# Patient Record
Sex: MALE | Race: WHITE | Employment: STUDENT | URBAN - METROPOLITAN AREA
[De-identification: names, ages, dates, MRNs, and addresses within clinical notes are randomized per-mention and may not be internally consistent; named-entity substitution may affect disease eponyms.]

---

## 2017-01-04 ENCOUNTER — OFFICE VISIT (OUTPATIENT)
Dept: URGENT CARE | Facility: RETAIL CLINIC | Age: 22
End: 2017-01-04
Payer: COMMERCIAL

## 2017-01-04 VITALS
TEMPERATURE: 96.7 F | DIASTOLIC BLOOD PRESSURE: 80 MMHG | HEART RATE: 94 BPM | OXYGEN SATURATION: 98 % | SYSTOLIC BLOOD PRESSURE: 146 MMHG

## 2017-01-04 DIAGNOSIS — R05.9 COUGH: Primary | ICD-10-CM

## 2017-01-04 DIAGNOSIS — J22 CHEST COLD: ICD-10-CM

## 2017-01-04 DIAGNOSIS — J45.30 MILD PERSISTENT ASTHMA WITHOUT COMPLICATION: ICD-10-CM

## 2017-01-04 DIAGNOSIS — R03.0 ELEVATED BLOOD PRESSURE READING WITHOUT DIAGNOSIS OF HYPERTENSION: ICD-10-CM

## 2017-01-04 PROCEDURE — 99213 OFFICE O/P EST LOW 20 MIN: CPT | Performed by: PHYSICIAN ASSISTANT

## 2017-01-04 RX ORDER — CODEINE PHOSPHATE AND GUAIFENESIN 10; 100 MG/5ML; MG/5ML
1 SOLUTION ORAL EVERY 4 HOURS PRN
Qty: 120 ML | Refills: 0 | Status: SHIPPED | OUTPATIENT
Start: 2017-01-04 | End: 2017-09-25

## 2017-01-04 NOTE — PROGRESS NOTES
SUBJECTIVE:   Pt. presenting to [facility] -  with a chief complaint of cough off and on x 2 weeks - symptoms wax and wane. Some 'normal' days. Minimal nasal discharge. No SOB or chest pain. Afebrile. Energy level is normal.  Hx of asthma yes  Here with mother and sister.  Onset of symptoms gradual  Course of illness is same.    Severity mild  Current and Associated symptoms: rhinorrhea and cough   Treatment measures tried include Inhaler (name: albuterol -see med list).  Predisposing factors include HX of asthma.  Last antibiotic Doxy for acne     Smoker no    Past Medical History   Diagnosis Date     Asthma      No past surgical history on file.  Patient Active Problem List   Diagnosis     Attention deficit disorder     Seasonal allergic rhinitis     Mild persistent asthma     Current Outpatient Prescriptions   Medication     Azelastine HCl 0.15 % SOLN     doxycycline (VIBRAMYCIN) 50 MG capsule     adapalene (DIFFERIN) 0.1 % gel     omeprazole (PRILOSEC) 20 MG capsule     albuterol (VENTOLIN HFA) 108 (90 BASE) MCG/ACT inhaler     ipratropium - albuterol 0.5 mg/2.5 mg/3 mL (DUONEB) 0.5-2.5 (3) MG/3ML nebulization     EPINEPHrine (EPIPEN) 0.3 MG/0.3ML injection     ipratropium (ATROVENT) 0.03 % nasal spray     beclomethasone (QVAR) 80 MCG/ACT Inhaler     Cetirizine HCl (ZYRTEC ALLERGY PO)     SPACER/AERO CHAMBER MOUTHPIECE MISC     No current facility-administered medications for this visit.       OBJECTIVE:  /80 mmHg  Pulse 94  Temp(Src) 96.7  F (35.9  C) (Oral)  SpO2 98%    GENERAL APPEARANCE: cooperative, alert and no distress. Appears well hydrated.  EYES: conjunctiva clear  HENT: Rt ear canal  clear and TM normal   Lt ear canal clear and TM normal   Nose some congestion. clear discharge  Mouth without ulcers or lesions. no erythema. no exudate.  NECK: supple, few small shoddy NT ant nodes. No  posterior nodes.  RESP: lungs clear to auscultation - no rales, rhonchi or wheezes. Breathing  easily. Frequent dry cough  CV: regular rates and rhythm  ABDOMEN:  soft, nontender, no HSM or masses and bowel sounds normal   SKIN: no suspicious lesions or rashes  no tenderness to palpate over  sinus areas.      ASSESSMENT:         Cough  Elevated blood pressure reading without diagnosis of hypertension  Mild persistent asthma without complication  Chest cold      PLAN:  Symptomatic measures   Prescriptions as below. Discussed indications, dosing, side affects and adverse reactions of medications with  Patient -Daniele AC to take HS. Some cough may be from PND  saline nasal spray   Cool mist vaporizer.   Stay in clean air environment.  > rest.  > fluids.  Contagiousness and hygiene discussed.  Fever and pain  control measures discussed.   If unable to swallow or any breathing difficulty to go to ED     Discussed BP - needs recheck:  Patient Instructions   Your blood pressure is elevated at today's visit.  You should follow up with your primary provider regarding possible hypertension if your recheck are greater than 140/90.  Check your blood pressure several times in the next week or so. You can do this at local pharmacies, grocery stores or with the float nurse at the [facility]. Record your readings and take them with you to your appointment.  Goal BP <140/90  Do not take decongestants - they can raise your BP.  If you have chest pain, unusual headaches, vision changes or any sign or symptoms of stroke seek prompt medical attention.              Follow up with your primary care provider if not improving, anytime if worse and if symptoms do not resolve.  [facility]  [phone]    PT is comfortable with this plan.  Electronically signed,  RADHA Knowles, PAC

## 2017-01-04 NOTE — MR AVS SNAPSHOT
"              After Visit Summary   1/4/2017    Jaiden Lozoya    MRN: [id number]           Patient Information     Date Of Birth          1995        Visit Information        Provider Department      1/4/2017 12:40 PM Elizabeth Knowles PA-C [facility]        Today's Diagnoses     Cough    -  1     Mild persistent asthma with acute exacerbation         Elevated blood pressure reading without diagnosis of hypertension           Care Instructions    Your blood pressure is elevated at today's visit.  You should follow up with your primary provider regarding possible hypertension if your recheck are greater than 140/90.  Check your blood pressure several times in the next week or so. You can do this at local pharmacies, grocery stores or with the float nurse at the [facility]. Record your readings and take them with you to your appointment.  Goal BP <140/90  Do not take decongestants - they can raise your BP.  If you have chest pain, unusual headaches, vision changes or any sign or symptoms of stroke seek prompt medical attention.            Follow-ups after your visit        Your next 10 appointments already scheduled     Jan 13, 2017  3:00 PM   [facility]urse [facility] with [facility]    [address] [place] [address] [phone]              Who to contact     You can reach your care team any time of the day by calling [phone].  Notification of test results:  If you have an abnormal lab result, we will notify you by phone as soon as possible.         Additional Information About Your Visit        MyChart Information     LIKECHARITYhart lets you send messages to your doctor, view your test results, renew your prescriptions, schedule appointments and more. To sign up, go to [email or web address] . Click on \"Log in\" on the left side of the screen, which will take you to the Welcome page. Then click on \"Sign up Now\" on the " right side of the page.     You will be asked to enter the access code listed below, as well as some personal information. Please follow the directions to create your username and password.     Your access code is: [email or web address]  Expires: 2017 12:49 PM     Your access code will  in 90 days. If you need help or a new code, please call your [facility] or [phone].        Care EveryWhere ID     This is your Care EveryWhere ID. This could be used by other organizations to access your [place] medical records  [id number]        Your Vitals Were     Pulse Temperature Pulse Oximetry             94 96.7  F (35.9  C) (Oral) 98%          Blood Pressure from Last 3 Encounters:   17 146/80   16 132/84   10/20/15 128/80    Weight from Last 3 Encounters:   16 297 lb (134.718 kg) (99.88 %*)   16 293 lb (132.904 kg) (99.86 %*)   10/20/15 280 lb 4.8 oz (127.143 kg) (99.76 %*)     * Growth percentiles are based on [facility] 2-20 Years data.              Today, you had the following     No orders found for display         Today's Medication Changes          These changes are accurate as of: 17 12:49 PM.  If you have any questions, ask your nurse or doctor.               Start taking these medicines.        Dose/Directions    guaiFENesin-codeine 100-10 MG/5ML Soln solution   Commonly known as:  ROBITUSSIN AC   Used for:  Cough        Dose:  1 tsp.   Take 5 mLs by mouth every 4 hours as needed for cough   Quantity:  120 mL   Refills:  0            Where to get your medicines      Some of these will need a paper prescription and others can be bought over the counter.  Ask your nurse if you have questions.     Bring a paper prescription for each of these medications    - guaiFENesin-codeine 100-10 MG/5ML Soln solution             Primary Care Provider Office Phone # Fax #    Raymundo Hartman [phone]       [facility] [address] [facility] [address]        Thank  you!     Thank you for choosing [facility]  for your care. Our goal is always to provide you with excellent care. Hearing back from our patients is one way we can continue to improve our services. Please take a few minutes to complete the written survey that you may receive in the mail after your visit with us. Thank you!             Your Updated Medication List - Protect others around you: Learn how to safely use, store and throw away your medicines at www.disposemymeds.org.          This list is accurate as of: 1/4/17 12:49 PM.  Always use your most recent med list.                   Brand Name Dispense Instructions for use    adapalene 0.1 % gel    DIFFERIN         albuterol 108 (90 BASE) MCG/ACT Inhaler    VENTOLIN HFA    1 Inhaler    Inhale 2 puffs into the lungs every 4 hours as needed for shortness of breath / dyspnea       Azelastine HCl 0.15 % Soln      Spray 1 spray in nostril daily       beclomethasone 80 MCG/ACT Inhaler    QVAR    1 Inhaler    Inhale 2 puffs into the lungs 2 times daily 2 puff in morning 2 puffs at night       doxycycline 50 MG capsule    VIBRAMYCIN     Take 1 capsule by mouth 2 times daily       EPINEPHrine 0.3 MG/0.3ML injection     0.6 mL    Inject 0.3 mLs (0.3 mg) into the muscle as needed for anaphylaxis       guaiFENesin-codeine 100-10 MG/5ML Soln solution    ROBITUSSIN AC    120 mL    Take 5 mLs by mouth every 4 hours as needed for cough       ipratropium - albuterol 0.5 mg/2.5 mg/3 mL 0.5-2.5 (3) MG/3ML neb solution    DUONEB    1 Box    Inhale 1 vial (3 mLs) into the lungs every 4 hours as needed for shortness of breath / dyspnea       ipratropium 0.03 % spray    ATROVENT         omeprazole 20 MG CR capsule    priLOSEC    60 capsule    Take 1 capsule (20 mg) by mouth 2 times daily       Spacer/Aero Chamber Mouthpiece Misc     1    use with albuterol inhaler       ZYRTEC ALLERGY PO

## 2017-01-04 NOTE — PATIENT INSTRUCTIONS
Your blood pressure is elevated at today's visit.  You should follow up with your primary provider regarding possible hypertension if your recheck are greater than 140/90.  Check your blood pressure several times in the next week or so. You can do this at local pharmacies, grocery stores or with the float nurse at the [facility]. Record your readings and take them with you to your appointment.  Goal BP <140/90  Do not take decongestants - they can raise your BP.  If you have chest pain, unusual headaches, vision changes or any sign or symptoms of stroke seek prompt medical attention.

## 2017-01-13 ENCOUNTER — ALLIED HEALTH/NURSE VISIT (OUTPATIENT)
Dept: ALLERGY | Facility: CLINIC | Age: 22
End: 2017-01-13
Payer: COMMERCIAL

## 2017-01-13 DIAGNOSIS — J30.9 ALLERGIC RHINITIS, UNSPECIFIED: Primary | ICD-10-CM

## 2017-01-13 PROCEDURE — 95115 IMMUNOTHERAPY ONE INJECTION: CPT

## 2017-01-13 NOTE — PROGRESS NOTES
Patient presented after waiting 30 minutes with no reaction to allergy injections. Discharged from clinic.  Kandi Obrien MA

## 2017-01-26 ENCOUNTER — TELEPHONE (OUTPATIENT)
Dept: FAMILY MEDICINE | Facility: CLINIC | Age: 22
End: 2017-01-26

## 2017-02-07 ENCOUNTER — TELEPHONE (OUTPATIENT)
Dept: ALLERGY | Facility: OTHER | Age: 22
End: 2017-02-07

## 2017-02-07 NOTE — TELEPHONE ENCOUNTER
Pt allergy serum was received in [place] on 02/07/17 for allergy injections on 02/08/17.      Jaymie Romero CMA ([facility])

## 2017-02-08 ENCOUNTER — ALLIED HEALTH/NURSE VISIT (OUTPATIENT)
Dept: ALLERGY | Facility: OTHER | Age: 22
End: 2017-02-08
Payer: COMMERCIAL

## 2017-02-08 DIAGNOSIS — J30.9 ALLERGIC RHINITIS, UNSPECIFIED: Primary | ICD-10-CM

## 2017-02-08 PROCEDURE — 95115 IMMUNOTHERAPY ONE INJECTION: CPT

## 2017-02-16 DIAGNOSIS — J45.30 MILD PERSISTENT ASTHMA WITHOUT COMPLICATION: ICD-10-CM

## 2017-02-16 RX ORDER — ALBUTEROL SULFATE 90 UG/1
2 AEROSOL, METERED RESPIRATORY (INHALATION) EVERY 4 HOURS PRN
Qty: 1 INHALER | Refills: 1 | Status: SHIPPED | OUTPATIENT
Start: 2017-02-16 | End: 2017-08-30

## 2017-02-16 NOTE — TELEPHONE ENCOUNTER
Ventolin inhaler       Last Written Prescription Date: 09/20/16  Last Fill Quantity: 1, # refills: 3    Last Office Visit with [facility], [facility] or [facility] prescribing provider:  09/20/16   Future Office Visit:       Date of Last Asthma Action Plan Letter:   Asthma Action Plan Q1 Year    Topic Date Due     Asthma Action Plan - yearly  09/20/2017      Asthma Control Test:   ACT Total Scores 9/20/2016   ACT TOTAL SCORE -   ASTHMA ER VISITS -   ASTHMA HOSPITALIZATIONS -   ACT TOTAL SCORE (Goal Greater than or Equal to 20) 24   In the past 12 months, how many times did you visit the emergency room for your asthma without being admitted to the hospital? 0   In the past 12 months, how many times were you hospitalized overnight because of your asthma? 0       Date of Last Spirometry Test:   No results found for this or any previous visit.

## 2017-03-09 ENCOUNTER — ALLIED HEALTH/NURSE VISIT (OUTPATIENT)
Dept: ALLERGY | Facility: OTHER | Age: 22
End: 2017-03-09
Payer: COMMERCIAL

## 2017-03-09 DIAGNOSIS — J30.9 ALLERGIC RHINITIS, UNSPECIFIED: Primary | ICD-10-CM

## 2017-03-09 PROCEDURE — 95115 IMMUNOTHERAPY ONE INJECTION: CPT

## 2017-03-09 NOTE — MR AVS SNAPSHOT
After Visit Summary   3/9/2017    Jaiden Lozoya    MRN: [id number]           Patient Information     Date Of Birth          1995        Visit Information        Provider Department      3/9/2017 2:15 PM ER ALLERGY SHOTS [facility]        Today's Diagnoses     Allergic rhinitis, unspecified    -  1       Follow-ups after your visit        Your next 10 appointments already scheduled     Apr 06, 2017  5:15 PM CDT   [facility]urse [facility] with [facility] SH[facility]S   [facility]    [address]  [facility] [place] [facility] [address] [phone]            Apr 20, 2017  1:00 PM CDT   Nurse Only with ER ALLERGY SHOTS   [facility]    [address]  [place] [address] [phone]            May 04, 2017  4:15 PM CDT   Nurse Only with ER ALLERGY SHOTS   [facility]    [address]  [facility] [address] [phone]              Who to contact     If you have questions or need follow up information about today's clinic visit or your schedule please contact [facility] directly at [phone].  Normal or non-critical lab and imaging results will be communicated to you by Outernethart, letter or phone within 4 business days after the clinic has received the results. If you do not hear from us within 7 days, please contact the clinic through Outernethart or phone. If you have a critical or abnormal lab result, we will notify you by phone as soon as possible.  Submit refill requests through Blinkbuggy or call your pharmacy and they will forward the refill request to us. Please allow 3 business days for your refill to be completed.          Additional Information About Your Visit        Blinkbuggy Information     Blinkbuggy lets you send messages to your doctor, view your test results, renew your prescriptions, schedule appointments and  "more. To sign up, go to [email or web address] . Click on \"Log in\" on the left side of the screen, which will take you to the Welcome page. Then click on \"Sign up Now\" on the right side of the page.     You will be asked to enter the access code listed below, as well as some personal information. Please follow the directions to create your username and password.     Your access code is: [email or web address]  Expires: 2017 12:49 PM     Your access code will  in 90 days. If you need help or a new code, please call your [place] clinic or [phone].        Care EveryWhere ID     This is your Care EveryWhere ID. This could be used by other organizations to access your [facility] medical records  [id number]         Blood Pressure from Last 3 Encounters:   17 146/80   16 132/84   10/20/15 128/80    Weight from Last 3 Encounters:   16 297 lb (134.7 kg) (>99 %)*   16 293 lb (132.9 kg) (>99 %)*   10/20/15 280 lb 4.8 oz (127.1 kg) (>99 %)*     * Growth percentiles are based on [facility] 2-20 Years data.              We Performed the Following     Allergy Shot: One injection        Primary Care Provider Office Phone # Fax #    Raymundo Hartman [phone]       [place] [address] [place] [id number]        Thank you!     Thank you for choosing [facility]  for your care. Our goal is always to provide you with excellent care. Hearing back from our patients is one way we can continue to improve our services. Please take a few minutes to complete the written survey that you may receive in the mail after your visit with us. Thank you!             Your Updated Medication List - Protect others around you: Learn how to safely use, store and throw away your medicines at www.disposemymeds.org.          This list is accurate as of: 3/9/17  4:58 PM.  Always use your most recent med list.                   Brand Name Dispense Instructions for use    adapalene 0.1 % " gel    DIFFERIN         albuterol 108 (90 BASE) MCG/ACT Inhaler    VENTOLIN HFA    1 Inhaler    Inhale 2 puffs into the lungs every 4 hours as needed for shortness of breath / dyspnea       Azelastine HCl 0.15 % Soln      Spray 1 spray in nostril daily       beclomethasone 80 MCG/ACT Inhaler    QVAR    1 Inhaler    Inhale 2 puffs into the lungs 2 times daily 2 puff in morning 2 puffs at night       doxycycline 50 MG capsule    VIBRAMYCIN     Take 1 capsule by mouth 2 times daily       EPINEPHrine 0.3 MG/0.3ML injection     0.6 mL    Inject 0.3 mLs (0.3 mg) into the muscle as needed for anaphylaxis       guaiFENesin-codeine 100-10 MG/5ML Soln solution    ROBITUSSIN AC    120 mL    Take 5 mLs by mouth every 4 hours as needed for cough       ipratropium - albuterol 0.5 mg/2.5 mg/3 mL 0.5-2.5 (3) MG/3ML neb solution    DUONEB    1 Box    Inhale 1 vial (3 mLs) into the lungs every 4 hours as needed for shortness of breath / dyspnea       ipratropium 0.03 % spray    ATROVENT         omeprazole 20 MG CR capsule    priLOSEC    60 capsule    Take 1 capsule (20 mg) by mouth 2 times daily       Spacer/Aero Chamber Mouthpiece Misc     1    use with albuterol inhaler       ZYRTEC ALLERGY PO

## 2017-03-09 NOTE — PROGRESS NOTES
Patient presented after waiting 30 minutes with no reaction to allergy injections. Discharged from clinic.  Radha Pena CMA

## 2017-04-06 ENCOUNTER — ALLIED HEALTH/NURSE VISIT (OUTPATIENT)
Dept: ALLERGY | Facility: OTHER | Age: 22
End: 2017-04-06
Payer: COMMERCIAL

## 2017-04-06 DIAGNOSIS — J30.9 ALLERGIC RHINITIS, UNSPECIFIED: Primary | ICD-10-CM

## 2017-04-06 PROCEDURE — 95115 IMMUNOTHERAPY ONE INJECTION: CPT

## 2017-04-06 NOTE — PROGRESS NOTES
Patient presented after waiting 30 minutes with no reaction to allergy injections. Discharged from clinic.  Radha Pena CMA ....... 4/6/2017 6:08 PM

## 2017-04-06 NOTE — MR AVS SNAPSHOT
"              After Visit Summary   4/6/2017    Jaiden Lozoya    MRN: [id number]           Patient Information     Date Of Birth          1995        Visit Information        Provider Department      4/6/2017 5:15 PM ER ALLERGY SHOTS [facility]        Today's Diagnoses     Allergic rhinitis, unspecified    -  1       Follow-ups after your visit        Your next 10 appointments already scheduled     Apr 20, 2017  1:00 PM CDT   Nurse Only with ER ALLERGY SHOTS   [facility]    [address]  [place] [address] [phone]            May 04, 2017  4:15 PM CDT   Nurse Only with ER ALLERGY SHOTS   [facility] ([facility]    [address]  [place] [address] [phone]              Who to contact     If you have questions or need follow up information about today's clinic visit or your schedule please contact [facility] directly at [phone].  Normal or non-critical lab and imaging results will be communicated to you by Hansofthart, letter or phone within 4 business days after the clinic has received the results. If you do not hear from us within 7 days, please contact the clinic through [employer] or phone. If you have a critical or abnormal lab result, we will notify you by phone as soon as possible.  Submit refill requests through [employer] or call your pharmacy and they will forward the refill request to us. Please allow 3 business days for your refill to be completed.          Additional Information About Your Visit        HansoftharContinuent Information     Innovative Biosensors lets you send messages to your doctor, view your test results, renew your prescriptions, schedule appointments and more. To sign up, go to [email or web address] . Click on \"Log in\" on the left side of the screen, which will take you to the Welcome page. Then click on \"Sign up Now\" on the right side of the page. "     You will be asked to enter the access code listed below, as well as some personal information. Please follow the directions to create your username and password.     Your access code is: [email or web address]  Expires: 2017  6:08 PM     Your access code will  in 90 days. If you need help or a new code, please call your [facility] clinic or [phone].        Care EveryWhere ID     This is your Care EveryWhere ID. This could be used by other organizations to access your [facility] medical records  [id number]         Blood Pressure from Last 3 Encounters:   17 146/80   16 132/84   10/20/15 128/80    Weight from Last 3 Encounters:   16 297 lb (134.7 kg) (>99 %)*   16 293 lb (132.9 kg) (>99 %)*   10/20/15 280 lb 4.8 oz (127.1 kg) (>99 %)*     * Growth percentiles are based on [facility] 2-20 Years data.              We Performed the Following     Allergy Shot: One injection        Primary Care Provider Office Phone # Fax #    Raymundo Hartman [phone]       [place] [address] [place] [address]        Thank you!     Thank you for choosing [facility]  for your care. Our goal is always to provide you with excellent care. Hearing back from our patients is one way we can continue to improve our services. Please take a few minutes to complete the written survey that you may receive in the mail after your visit with us. Thank you!             Your Updated Medication List - Protect others around you: Learn how to safely use, store and throw away your medicines at www.disposemymeds.org.          This list is accurate as of: 17  6:08 PM.  Always use your most recent med list.                   Brand Name Dispense Instructions for use    adapalene 0.1 % gel    DIFFERIN         albuterol 108 (90 BASE) MCG/ACT Inhaler    VENTOLIN HFA    1 Inhaler    Inhale 2 puffs into the lungs every 4 hours as needed for shortness of breath / dyspnea       Azelastine  HCl 0.15 % Soln      Spray 1 spray in nostril daily       beclomethasone 80 MCG/ACT Inhaler    QVAR    1 Inhaler    Inhale 2 puffs into the lungs 2 times daily 2 puff in morning 2 puffs at night       doxycycline 50 MG capsule    VIBRAMYCIN     Take 1 capsule by mouth 2 times daily       EPINEPHrine 0.3 MG/0.3ML injection     0.6 mL    Inject 0.3 mLs (0.3 mg) into the muscle as needed for anaphylaxis       guaiFENesin-codeine 100-10 MG/5ML Soln solution    ROBITUSSIN AC    120 mL    Take 5 mLs by mouth every 4 hours as needed for cough       ipratropium - albuterol 0.5 mg/2.5 mg/3 mL 0.5-2.5 (3) MG/3ML neb solution    DUONEB    1 Box    Inhale 1 vial (3 mLs) into the lungs every 4 hours as needed for shortness of breath / dyspnea       ipratropium 0.03 % spray    ATROVENT         omeprazole 20 MG CR capsule    priLOSEC    60 capsule    Take 1 capsule (20 mg) by mouth 2 times daily       Spacer/Aero Chamber Mouthpiece Misc     1    use with albuterol inhaler       ZYRTEC ALLERGY PO

## 2017-04-20 ENCOUNTER — ALLIED HEALTH/NURSE VISIT (OUTPATIENT)
Dept: ALLERGY | Facility: OTHER | Age: 22
End: 2017-04-20
Payer: COMMERCIAL

## 2017-04-20 DIAGNOSIS — J30.9 ALLERGIC RHINITIS, UNSPECIFIED: Primary | ICD-10-CM

## 2017-04-20 PROCEDURE — 95115 IMMUNOTHERAPY ONE INJECTION: CPT

## 2017-04-20 NOTE — MR AVS SNAPSHOT
"              After Visit Summary   4/20/2017    Jaiden Lozoya    MRN: [id number]           Patient Information     Date Of Birth          1995        Visit Information        Provider Department      4/20/2017 1:00 PM ER ALLERGY SHOTS [facility]        Today's Diagnoses     Allergic rhinitis, unspecified    -  1       Follow-ups after your visit        Your next 10 appointments already scheduled     May 04, 2017  4:15 PM CDT   Nurse Only with ER ALLERGY SHOTS   [facility]    [address]  [facility] [id number] [phone]              Who to contact     If you have questions or need follow up information about today's clinic visit or your schedule please contact [facility] directly at [phone].  Normal or non-critical lab and imaging results will be communicated to you by MyChart, letter or phone within 4 business days after the clinic has received the results. If you do not hear from us within 7 days, please contact the clinic through MyChart or phone. If you have a critical or abnormal lab result, we will notify you by phone as soon as possible.  Submit refill requests through Krikle or call your pharmacy and they will forward the refill request to us. Please allow 3 business days for your refill to be completed.          Additional Information About Your Visit        Faniticshart Information     Krikle lets you send messages to your doctor, view your test results, renew your prescriptions, schedule appointments and more. To sign up, go to [email or web address] . Click on \"Log in\" on the left side of the screen, which will take you to the Welcome page. Then click on \"Sign up Now\" on the right side of the page.     You will be asked to enter the access code listed below, as well as some personal information. Please follow the directions to create your username and password.     Your access code is: " [email or web address]  Expires: 2017  6:08 PM     Your access code will  in 90 days. If you need help or a new code, please call your [place] clinic or [phone].        Care EveryWhere ID     This is your Care EveryWhere ID. This could be used by other organizations to access your [place] medical records  [id number]         Blood Pressure from Last 3 Encounters:   17 146/80   16 132/84   10/20/15 128/80    Weight from Last 3 Encounters:   16 297 lb (134.7 kg) (>99 %)*   16 293 lb (132.9 kg) (>99 %)*   10/20/15 280 lb 4.8 oz (127.1 kg) (>99 %)*     * Growth percentiles are based on [facility] 2-20 Years data.              We Performed the Following     Allergy Shot: One injection        Primary Care Provider Office Phone # Fax #    Raymundo Hartman [phone]       [facility] [address] [facility] [address]        Thank you!     Thank you for choosing [facility]  for your care. Our goal is always to provide you with excellent care. Hearing back from our patients is one way we can continue to improve our services. Please take a few minutes to complete the written survey that you may receive in the mail after your visit with us. Thank you!             Your Updated Medication List - Protect others around you: Learn how to safely use, store and throw away your medicines at www.disposemymeds.org.          This list is accurate as of: 17  3:30 PM.  Always use your most recent med list.                   Brand Name Dispense Instructions for use    adapalene 0.1 % gel    DIFFERIN         albuterol 108 (90 BASE) MCG/ACT Inhaler    VENTOLIN HFA    1 Inhaler    Inhale 2 puffs into the lungs every 4 hours as needed for shortness of breath / dyspnea       Azelastine HCl 0.15 % Soln      Spray 1 spray in nostril daily       beclomethasone 80 MCG/ACT Inhaler    QVAR    1 Inhaler    Inhale 2 puffs into the lungs 2 times daily 2 puff in morning 2 puffs at  night       doxycycline 50 MG capsule    VIBRAMYCIN     Take 1 capsule by mouth 2 times daily       EPINEPHrine 0.3 MG/0.3ML injection     0.6 mL    Inject 0.3 mLs (0.3 mg) into the muscle as needed for anaphylaxis       guaiFENesin-codeine 100-10 MG/5ML Soln solution    ROBITUSSIN AC    120 mL    Take 5 mLs by mouth every 4 hours as needed for cough       ipratropium - albuterol 0.5 mg/2.5 mg/3 mL 0.5-2.5 (3) MG/3ML neb solution    DUONEB    1 Box    Inhale 1 vial (3 mLs) into the lungs every 4 hours as needed for shortness of breath / dyspnea       ipratropium 0.03 % spray    ATROVENT         omeprazole 20 MG CR capsule    priLOSEC    60 capsule    Take 1 capsule (20 mg) by mouth 2 times daily       Spacer/Aero Chamber Mouthpiece Misc     1    use with albuterol inhaler       ZYRTEC ALLERGY PO

## 2017-04-20 NOTE — PROGRESS NOTES
Patient presented after waiting 30 minutes with no reaction to allergy injections. Discharged from clinic.  Radha Pena CMA ....... 4/20/2017 3:29 PM

## 2017-05-04 ENCOUNTER — ALLIED HEALTH/NURSE VISIT (OUTPATIENT)
Dept: ALLERGY | Facility: OTHER | Age: 22
End: 2017-05-04
Payer: COMMERCIAL

## 2017-05-04 DIAGNOSIS — J30.9 ALLERGIC RHINITIS, UNSPECIFIED: Primary | ICD-10-CM

## 2017-05-04 PROCEDURE — 95115 IMMUNOTHERAPY ONE INJECTION: CPT

## 2017-05-04 NOTE — MR AVS SNAPSHOT
After Visit Summary   5/4/2017    Jaiden Lozoya    MRN: [id number]           Patient Information     Date Of Birth          1995        Visit Information        Provider Department      5/4/2017 4:15 PM ER ALLERGY SHOTS [facility]        Today's Diagnoses     Allergic rhinitis, unspecified    -  1       Follow-ups after your visit        Your next 10 appointments already scheduled     Jun 01, 2017  5:45 PM CDT   [facility]    [address]  [facility] [address]   [phone]            Jun 29, 2017  4:30 PM CDT   Nurse Only with ER [facility] SHOT[facility]   [facility]    [address]  [facility] [facility] [address]   [phone]            Jul 27, 2017  4:30 PM CDT   Nurse Only with ER [facility]ERGY SHOTS   [facility]    [address]  [facility] [facility] [address]   [phone]            Aug 24, 2017  4:30 PM CDT   Nurse Only with ER ALLERGY SHOTS   [facility]    [address]  [facility] [facility] [address]   [phone]              Who to contact     If you have questions or need follow up information about today's clinic visit or your schedule please contact [facility] directly at [phone].  Normal or non-critical lab and imaging results will be communicated to you by MyChart, letter or phone within 4 business days after the clinic has received the results. If you do not hear from us within 7 days, please contact the clinic through [employer] Cleaninghart or phone. If you have a critical or abnormal lab result, we will notify you by phone as soon as possible.  Submit refill requests through StoryWorth or call your pharmacy and they will forward the refill request to us. Please allow 3 business days for your refill to be completed.  "         Additional Information About Your Visit        MyChart Information     [employer] lets you send messages to your doctor, view your test results, renew your prescriptions, schedule appointments and more. To sign up, go to [email or web address] . Click on \"Log in\" on the left side of the screen, which will take you to the Welcome page. Then click on \"Sign up Now\" on the right side of the page.     You will be asked to enter the access code listed below, as well as some personal information. Please follow the directions to create your username and password.     Your access code is: [email or web address]  Expires: 2017  6:08 PM     Your access code will  in 90 days. If you need help or a new code, please call your [place] clinic or [phone].        Care EveryWhere ID     This is your Care EveryWhere ID. This could be used by other organizations to access your [place] medical records  [id number]         Blood Pressure from Last 3 Encounters:   17 146/80   16 132/84   10/20/15 128/80    Weight from Last 3 Encounters:   16 134.7 kg (297 lb) (>99 %)*   16 132.9 kg (293 lb) (>99 %)*   10/20/15 127.1 kg (280 lb 4.8 oz) (>99 %)*     * Growth percentiles are based on [facility] 2-20 Years data.              We Performed the Following     Allergy Shot: One injection        Primary Care Provider Office Phone # Fax #    Raymundo Hartman [phone]       [facility] [address] [facility] [address]        Thank you!     Thank you for choosing [facility]  for your care. Our goal is always to provide you with excellent care. Hearing back from our patients is one way we can continue to improve our services. Please take a few minutes to complete the written survey that you may receive in the mail after your visit with us. Thank you!             Your Updated Medication List - Protect others around you: Learn how to safely use, store and throw away your " medicines at www.disposemymeds.org.          This list is accurate as of: 5/4/17  4:38 PM.  Always use your most recent med list.                   Brand Name Dispense Instructions for use    adapalene 0.1 % gel    DIFFERIN         albuterol 108 (90 BASE) MCG/ACT Inhaler    VENTOLIN HFA    1 Inhaler    Inhale 2 puffs into the lungs every 4 hours as needed for shortness of breath / dyspnea       Azelastine HCl 0.15 % Soln      Spray 1 spray in nostril daily       beclomethasone 80 MCG/ACT Inhaler    QVAR    1 Inhaler    Inhale 2 puffs into the lungs 2 times daily 2 puff in morning 2 puffs at night       doxycycline 50 MG capsule    VIBRAMYCIN     Take 1 capsule by mouth 2 times daily       EPINEPHrine 0.3 MG/0.3ML injection     0.6 mL    Inject 0.3 mLs (0.3 mg) into the muscle as needed for anaphylaxis       guaiFENesin-codeine 100-10 MG/5ML Soln solution    ROBITUSSIN AC    120 mL    Take 5 mLs by mouth every 4 hours as needed for cough       ipratropium - albuterol 0.5 mg/2.5 mg/3 mL 0.5-2.5 (3) MG/3ML neb solution    DUONEB    1 Box    Inhale 1 vial (3 mLs) into the lungs every 4 hours as needed for shortness of breath / dyspnea       ipratropium 0.03 % spray    ATROVENT         omeprazole 20 MG CR capsule    priLOSEC    60 capsule    Take 1 capsule (20 mg) by mouth 2 times daily       Spacer/Aero Chamber Mouthpiece Misc     1    use with albuterol inhaler       ZYRTEC ALLERGY PO

## 2017-05-04 NOTE — PROGRESS NOTES
Patient presented after waiting 30 minutes with no reaction to allergy injections. Discharged from clinic.    Kristina Carnes RN

## 2017-06-01 ENCOUNTER — ALLIED HEALTH/NURSE VISIT (OUTPATIENT)
Dept: ALLERGY | Facility: OTHER | Age: 22
End: 2017-06-01
Payer: COMMERCIAL

## 2017-06-01 DIAGNOSIS — J30.9 ALLERGIC RHINITIS, UNSPECIFIED: Primary | ICD-10-CM

## 2017-06-01 PROCEDURE — 99207 ZZC NO CHARGE LOS: CPT

## 2017-06-01 PROCEDURE — 95115 IMMUNOTHERAPY ONE INJECTION: CPT

## 2017-06-01 NOTE — PROGRESS NOTES
Patient presented after waiting 30 minutes with no reaction to allergy injections. Discharged from clinic.    Kristina Carnes RN ....... 6/1/2017 5:02 PM

## 2017-06-01 NOTE — MR AVS SNAPSHOT
After Visit Summary   6/1/2017    Jaiden Lozoya    MRN: [id number]           Patient Information     Date Of Birth          1995        Visit Information        Provider Department      6/1/2017 5:45 PM ER ALLERGY SHOTS [facility]        Today's Diagnoses     Allergic rhinitis, unspecified    -  1       Follow-ups after your visit        Your next 10 appointments already scheduled     Jun 01, 2017  5:45 PM CDT   [facility] with [facility]    [address]  [facility] [address]   [phone]            Jun 29, 2017  4:30 PM CDT   Nurse Only with ER [facility]GY SHOTS   [facility]    [address]  [facility] [address]   [phone]            Jul 27, 2017  4:30 PM CDT   Nurse Only with ER [facility]GY SHOTS   [facility]    [address]  [facility] [address]   [phone]            Aug 24, 2017  4:30 PM CDT   Nurse Only with ER ALLERGY SHOTS   [facility]    [address]  [facility] [address]   [phone]              Who to contact     If you have questions or need follow up information about today's clinic visit or your schedule please contact [facility] directly at [phone].  Normal or non-critical lab and imaging results will be communicated to you by MyChart, letter or phone within 4 business days after the clinic has received the results. If you do not hear from us within 7 days, please contact the clinic through Delfmemshart or phone. If you have a critical or abnormal lab result, we will notify you by phone as soon as possible.  Submit refill requests through Niblitz or call your pharmacy and they will forward the refill request to us. Please allow 3 business days for your refill to be completed.  "         Additional Information About Your Visit        MyChart Information     IES lets you send messages to your doctor, view your test results, renew your prescriptions, schedule appointments and more. To sign up, go to [email or web address] . Click on \"Log in\" on the left side of the screen, which will take you to the Welcome page. Then click on \"Sign up Now\" on the right side of the page.     You will be asked to enter the access code listed below, as well as some personal information. Please follow the directions to create your username and password.     Your access code is: [email or web address]  Expires: 2017  6:08 PM     Your access code will  in 90 days. If you need help or a new code, please call your [place] clinic or [phone].        Care EveryWhere ID     This is your Care EveryWhere ID. This could be used by other organizations to access your [place] medical records  [id number]         Blood Pressure from Last 3 Encounters:   17 146/80   16 132/84   10/20/15 128/80    Weight from Last 3 Encounters:   16 134.7 kg (297 lb) (>99 %)*   16 132.9 kg (293 lb) (>99 %)*   10/20/15 127.1 kg (280 lb 4.8 oz) (>99 %)*     * Growth percentiles are based on [facility] 2-20 Years data.              We Performed the Following     Allergy Shot: One injection        Primary Care Provider Office Phone # Fax #    Raymundo Hartman [phone]       [address] [place]HA [place] [address]        Thank you!     Thank you for choosing [facility]  for your care. Our goal is always to provide you with excellent care. Hearing back from our patients is one way we can continue to improve our services. Please take a few minutes to complete the written survey that you may receive in the mail after your visit with us. Thank you!             Your Updated Medication List - Protect others around you: Learn how to safely use, store and throw away " your medicines at www.disposemymeds.org.          This list is accurate as of: 6/1/17  5:02 PM.  Always use your most recent med list.                   Brand Name Dispense Instructions for use    adapalene 0.1 % gel    DIFFERIN         albuterol 108 (90 BASE) MCG/ACT Inhaler    VENTOLIN HFA    1 Inhaler    Inhale 2 puffs into the lungs every 4 hours as needed for shortness of breath / dyspnea       Azelastine HCl 0.15 % Soln      Spray 1 spray in nostril daily       beclomethasone 80 MCG/ACT Inhaler    QVAR    1 Inhaler    Inhale 2 puffs into the lungs 2 times daily 2 puff in morning 2 puffs at night       doxycycline 50 MG capsule    VIBRAMYCIN     Take 1 capsule by mouth 2 times daily       EPINEPHrine 0.3 MG/0.3ML injection     0.6 mL    Inject 0.3 mLs (0.3 mg) into the muscle as needed for anaphylaxis       guaiFENesin-codeine 100-10 MG/5ML Soln solution    ROBITUSSIN AC    120 mL    Take 5 mLs by mouth every 4 hours as needed for cough       ipratropium - albuterol 0.5 mg/2.5 mg/3 mL 0.5-2.5 (3) MG/3ML neb solution    DUONEB    1 Box    Inhale 1 vial (3 mLs) into the lungs every 4 hours as needed for shortness of breath / dyspnea       ipratropium 0.03 % spray    ATROVENT         omeprazole 20 MG CR capsule    priLOSEC    60 capsule    Take 1 capsule (20 mg) by mouth 2 times daily       Spacer/Aero Chamber Mouthpiece Misc     1    use with albuterol inhaler       ZYRTEC ALLERGY PO

## 2017-06-29 ENCOUNTER — ALLIED HEALTH/NURSE VISIT (OUTPATIENT)
Dept: ALLERGY | Facility: OTHER | Age: 22
End: 2017-06-29
Payer: COMMERCIAL

## 2017-06-29 DIAGNOSIS — J30.9 ALLERGIC RHINITIS, UNSPECIFIED: Primary | ICD-10-CM

## 2017-06-29 PROCEDURE — 99207 ZZC NO CHARGE LOS: CPT

## 2017-06-29 PROCEDURE — 95115 IMMUNOTHERAPY ONE INJECTION: CPT

## 2017-06-29 NOTE — PROGRESS NOTES
Patient presented after waiting 30 minutes with no reaction to allergy injections. Discharged from clinic.    Kristina Carnes RN ....... 6/29/2017 4:43 PM

## 2017-06-29 NOTE — MR AVS SNAPSHOT
"              After Visit Summary   6/29/2017    Jaiden Lozoya    MRN: [id number]           Patient Information     Date Of Birth          1995        Visit Information        Provider Department      6/29/2017 4:30 PM ER ALLERGY SHOTS [facility]        Today's Diagnoses     Allergic rhinitis, unspecified    -  1       Follow-ups after your visit        Your next 10 appointments already scheduled     Jul 27, 2017  4:30 PM CDT   Nurse Only with ER ALLERGY SHOTS   [facility] ([facility])    [address]  [facility] [address] [phone]            Aug 24, 2017  4:30 PM CDT   Nurse Only with ER ALLERGY SHOTS   [facility] ([facility])    [address]  [facility] [address] [phone]              Who to contact     If you have questions or need follow up information about today's clinic visit or your schedule please contact [facility] directly at [phone].  Normal or non-critical lab and imaging results will be communicated to you by Purswayhart, letter or phone within 4 business days after the clinic has received the results. If you do not hear from us within 7 days, please contact the clinic through PathDrugomicst or phone. If you have a critical or abnormal lab result, we will notify you by phone as soon as possible.  Submit refill requests through [employer] or call your pharmacy and they will forward the refill request to us. Please allow 3 business days for your refill to be completed.          Additional Information About Your Visit        PurswayharEntitle Information     [employer] lets you send messages to your doctor, view your test results, renew your prescriptions, schedule appointments and more. To sign up, go to [email or web address] . Click on \"Log in\" on the left side of the screen, which will take you to the Welcome page. Then click on \"Sign up Now\" on the right side of the " page.     You will be asked to enter the access code listed below, as well as some personal information. Please follow the directions to create your username and password.     Your access code is: [email or web address]  Expires: 2017  6:08 PM     Your access code will  in 90 days. If you need help or a new code, please call your [place] clinic or [phone].        Care EveryWhere ID     This is your Care EveryWhere ID. This could be used by other organizations to access your [place] medical records  [id number]         Blood Pressure from Last 3 Encounters:   17 146/80   16 132/84   10/20/15 128/80    Weight from Last 3 Encounters:   16 134.7 kg (297 lb) (>99 %)*   16 132.9 kg (293 lb) (>99 %)*   10/20/15 127.1 kg (280 lb 4.8 oz) (>99 %)*     * Growth percentiles are based on [facility] 2-20 Years data.              We Performed the Following     Allergy Shot: One injection        Primary Care Provider Office Phone # Fax #    Raymundo Hartman [phone]       [address]   [facility] [address]        Equal Access to Services     LEANDRO COSBY AH: Hadii aad ku hadasho Soomaali, waaxda luqadaha, qaybta kaalmada adeegyada, mayda ramos . So [facility] [phone].    ATENCIÓN: Si habla español, tiene a valdovinos disposición servicios gratuitos de asistencia lingüística. [facility] [phone].    We comply with applicable federal civil rights laws and [place] laws. We do not discriminate on the basis of race, color, national origin, age, disability sex, sexual orientation or gender identity.            Thank you!     Thank you for choosing [facility]  for your care. Our goal is always to provide you with excellent care. Hearing back from our patients is one way we can continue to improve our services. Please take a few minutes to complete the written survey that you may receive in the mail after your visit with us. Thank  you!             Your Updated Medication List - Protect others around you: Learn how to safely use, store and throw away your medicines at www.disposemymeds.org.          This list is accurate as of: 6/29/17  4:43 PM.  Always use your most recent med list.                   Brand Name Dispense Instructions for use Diagnosis    adapalene 0.1 % gel    DIFFERIN          albuterol 108 (90 BASE) MCG/ACT Inhaler    VENTOLIN HFA    1 Inhaler    Inhale 2 puffs into the lungs every 4 hours as needed for shortness of breath / dyspnea    Mild persistent asthma without complication       Azelastine HCl 0.15 % Soln      Spray 1 spray in nostril daily        beclomethasone 80 MCG/ACT Inhaler    QVAR    1 Inhaler    Inhale 2 puffs into the lungs 2 times daily 2 puff in morning 2 puffs at night    Mild persistent asthma       doxycycline 50 MG capsule    VIBRAMYCIN     Take 1 capsule by mouth 2 times daily        EPINEPHrine 0.3 MG/0.3ML injection     0.6 mL    Inject 0.3 mLs (0.3 mg) into the muscle as needed for anaphylaxis    Need for desensitization to allergens       guaiFENesin-codeine 100-10 MG/5ML Soln solution    ROBITUSSIN AC    120 mL    Take 5 mLs by mouth every 4 hours as needed for cough    Cough       ipratropium - albuterol 0.5 mg/2.5 mg/3 mL 0.5-2.5 (3) MG/3ML neb solution    DUONEB    1 Box    Inhale 1 vial (3 mLs) into the lungs every 4 hours as needed for shortness of breath / dyspnea    Mild persistent asthma without complication       ipratropium 0.03 % spray    ATROVENT          omeprazole 20 MG CR capsule    priLOSEC    60 capsule    Take 1 capsule (20 mg) by mouth 2 times daily    Cough       Spacer/Aero Chamber Mouthpiece Misc     1    use with albuterol inhaler    Exercise induced bronchospasm       ZYRTEC ALLERGY PO

## 2017-07-27 ENCOUNTER — ALLIED HEALTH/NURSE VISIT (OUTPATIENT)
Dept: ALLERGY | Facility: OTHER | Age: 22
End: 2017-07-27
Payer: COMMERCIAL

## 2017-07-27 DIAGNOSIS — J30.9 ALLERGIC RHINITIS, UNSPECIFIED: Primary | ICD-10-CM

## 2017-07-27 PROCEDURE — 99207 ZZC NO CHARGE LOS: CPT

## 2017-07-27 PROCEDURE — 95115 IMMUNOTHERAPY ONE INJECTION: CPT

## 2017-07-27 NOTE — PROGRESS NOTES
Patient presented after waiting 30 minutes with no reaction to allergy injections. Discharged from clinic.    Kristina Carnes RN ....... 7/27/2017 5:04 PM

## 2017-07-27 NOTE — MR AVS SNAPSHOT
After Visit Summary   7/27/2017    Jaiden Lozoya    MRN: [id number]           Patient Information     Date Of Birth          1995        Visit Information        Provider Department      7/27/2017 4:30 PM ER ALLERGY SHOTS [facility]        Today's Diagnoses     Allergic rhinitis, unspecified    -  1       Follow-ups after your visit        Your next 10 appointments already scheduled     Aug 24, 2017  4:30 PM CDT   Nurse Only with ER ALLERGY SHOTS   [facility] ([facility])    [address]  [place] [address]   [phone]            Sep 21, 2017  3:00 PM CDT   Nurse Only with ER ALLERGY SHOTS   [facility] ([facility])    [address]  [place] [address]   [phone]            Sep 25, 2017  5:30 PM CDT   Well Child with Raymundo Hartman MD   [facility] ([facility]    [address] [address]   [phone]            Oct 19, 2017  3:15 PM CDT   Nurse Only with ER ALLERGY SHOTS   [facility] ([facility])    [address]  [place] [address]   [phone]            Nov 16, 2017  3:15 PM CST   Nurse Only with ER ALLERGY SHOTS   [facility] ([facility])    [address] [facility]  [place] [address]   [phone]            Dec 14, 2017  3:15 PM CST   Nurse Only with ER ALLERGY SHOTS   [facility] ([facility])    [address]  [place] [address]   [phone]              Who to contact     If you have questions or need follow up information about today's clinic visit or your schedule please contact [facility] directly at [phone].  Normal or non-critical lab and imaging results will be communicated to you by MyChart, letter or phone within 4 business days after  "the clinic has received the results. If you do not hear from us within 7 days, please contact the clinic through [employer] or phone. If you have a critical or abnormal lab result, we will notify you by phone as soon as possible.  Submit refill requests through [employer] or call your pharmacy and they will forward the refill request to us. Please allow 3 business days for your refill to be completed.          Additional Information About Your Visit        [employer] Information     [employer] lets you send messages to your doctor, view your test results, renew your prescriptions, schedule appointments and more. To sign up, go to [email or web address] . Click on \"Log in\" on the left side of the screen, which will take you to the Welcome page. Then click on \"Sign up Now\" on the right side of the page.     You will be asked to enter the access code listed below, as well as some personal information. Please follow the directions to create your username and password.     Your access code is: [email or web address]  Expires: 10/25/2017  5:04 PM     Your access code will  in 90 days. If you need help or a new code, please call your [place] clinic or [phone].        Care EveryWhere ID     This is your Care EveryWhere ID. This could be used by other organizations to access your [place] medical records  [id number]         Blood Pressure from Last 3 Encounters:   17 146/80   16 132/84   10/20/15 128/80    Weight from Last 3 Encounters:   16 134.7 kg (297 lb) (>99 %)*   16 132.9 kg (293 lb) (>99 %)*   10/20/15 127.1 kg (280 lb 4.8 oz) (>99 %)*     * Growth percentiles are based on CDC 2-20 Years data.              We Performed the Following     Allergy Shot: One injection        Primary Care Provider Office Phone # Fax #    Raymundo Hartman [phone]       [address]   [facility] [address]        Equal Access to Services     LEANDRO COSBY AH: Alina diaz " Kayla, melbada luqadaha, qashaunnata kaesther newton, mayda miriin hayaan samanthaaris gerrisita laherbneal aidan. So [facility] [phone].    ATENCIÓN: Si darian patel, tiene a valdovinos disposición servicios gratuitos de asistencia lingüística. Darlin al [phone].    We comply with applicable federal civil rights laws and [place] laws. We do not discriminate on the basis of race, color, national origin, age, disability sex, sexual orientation or gender identity.            Thank you!     Thank you for choosing [facility]  for your care. Our goal is always to provide you with excellent care. Hearing back from our patients is one way we can continue to improve our services. Please take a few minutes to complete the written survey that you may receive in the mail after your visit with us. Thank you!             Your Updated Medication List - Protect others around you: Learn how to safely use, store and throw away your medicines at www.disposemymeds.org.          This list is accurate as of: 7/27/17  5:04 PM.  Always use your most recent med list.                   Brand Name Dispense Instructions for use Diagnosis    adapalene 0.1 % gel    DIFFERIN          albuterol 108 (90 BASE) MCG/ACT Inhaler    VENTOLIN HFA    1 Inhaler    Inhale 2 puffs into the lungs every 4 hours as needed for shortness of breath / dyspnea    Mild persistent asthma without complication       Azelastine HCl 0.15 % Soln      Spray 1 spray in nostril daily        beclomethasone 80 MCG/ACT Inhaler    QVAR    1 Inhaler    Inhale 2 puffs into the lungs 2 times daily 2 puff in morning 2 puffs at night    Mild persistent asthma       doxycycline 50 MG capsule    VIBRAMYCIN     Take 1 capsule by mouth 2 times daily        EPINEPHrine 0.3 MG/0.3ML injection 2-pack    EPIPEN/ADRENACLICK/or ANY BX GENERIC EQUIV    0.6 mL    Inject 0.3 mLs (0.3 mg) into the muscle as needed for anaphylaxis    Need for desensitization to allergens       guaiFENesin-codeine  100-10 MG/5ML Soln solution    ROBITUSSIN AC    120 mL    Take 5 mLs by mouth every 4 hours as needed for cough    Cough       ipratropium - albuterol 0.5 mg/2.5 mg/3 mL 0.5-2.5 (3) MG/3ML neb solution    DUONEB    1 Box    Inhale 1 vial (3 mLs) into the lungs every 4 hours as needed for shortness of breath / dyspnea    Mild persistent asthma without complication       ipratropium 0.03 % spray    ATROVENT          omeprazole 20 MG CR capsule    priLOSEC    60 capsule    Take 1 capsule (20 mg) by mouth 2 times daily    Cough       Spacer/Aero Chamber Mouthpiece Misc     1    use with albuterol inhaler    Exercise induced bronchospasm       ZYRTEC ALLERGY PO

## 2017-08-24 ENCOUNTER — ALLIED HEALTH/NURSE VISIT (OUTPATIENT)
Dept: ALLERGY | Facility: OTHER | Age: 22
End: 2017-08-24
Payer: COMMERCIAL

## 2017-08-24 DIAGNOSIS — J30.9 ALLERGIC RHINITIS, UNSPECIFIED: Primary | ICD-10-CM

## 2017-08-24 PROCEDURE — 95115 IMMUNOTHERAPY ONE INJECTION: CPT

## 2017-08-24 NOTE — PROGRESS NOTES
Patient presented after waiting 30 minutes with no reaction to allergy injections. Discharged from clinic.    Kristina Carnes RN ....... 8/24/2017 5:10 PM

## 2017-08-24 NOTE — MR AVS SNAPSHOT
After Visit Summary   8/24/2017    Jaiden Lozoya    MRN: [id number]           Patient Information     Date Of Birth          1995        Visit Information        Provider Department      8/24/2017 4:30 PM ER ALLERGY SHOTS [facility]        Today's Diagnoses     Allergic rhinitis, unspecified    -  1       Follow-ups after your visit        Your next 10 appointments already scheduled     Sep 21, 2017  3:00 PM CDT   Nurse Only with ER ALLERGY SHOTS   [facility] ([facility])    [address]  [address]  [facility] [address]   [phone]            Sep 25, 2017  5:30 PM CDT   Well Child with Raymundo Hartman MD   [facility]    [address] [address]   [phone]            Oct 19, 2017  3:15 PM CDT   Nurse Only with ER ALLERGY SHOTS   [facility] ([facility])    [address]  [facility] [address]   [phone]            Nov 16, 2017  3:15 PM CST   Nurse Only with ER ALLERGY SHOTS   [facility] ([facility])    [address]  [facility] [address]   [phone]            Dec 14, 2017  3:15 PM CST   Nurse Only with ER ALLERGY SHOTS   [facility] ([facility])    [address]  [facility] [address]   [phone]              Who to contact     If you have questions or need follow up information about today's clinic visit or your schedule please contact [facility] directly at [phone].  Normal or non-critical lab and imaging results will be communicated to you by MyChart, letter or phone within 4 business days after the clinic has received the results. If you do not hear from us within 7 days, please contact the clinic through MyChart or phone. If you have a critical or abnormal lab result, we will notify you by  "phone as soon as possible.  Submit refill requests through [employer] or call your pharmacy and they will forward the refill request to us. Please allow 3 business days for your refill to be completed.          Additional Information About Your Visit        GREE Information     GREE lets you send messages to your doctor, view your test results, renew your prescriptions, schedule appointments and more. To sign up, go to [email or web address] . Click on \"Log in\" on the left side of the screen, which will take you to the Welcome page. Then click on \"Sign up Now\" on the right side of the page.     You will be asked to enter the access code listed below, as well as some personal information. Please follow the directions to create your username and password.     Your access code is: [email or web address]  Expires: 10/25/2017  5:04 PM     Your access code will  in 90 days. If you need help or a new code, please call your [place] clinic or [phone].        Care EveryWhere ID     This is your Care EveryWhere ID. This could be used by other organizations to access your [place] medical records  [id number]         Blood Pressure from Last 3 Encounters:   17 146/80   16 132/84   10/20/15 128/80    Weight from Last 3 Encounters:   16 134.7 kg (297 lb) (>99 %)*   16 132.9 kg (293 lb) (>99 %)*   10/20/15 127.1 kg (280 lb 4.8 oz) (>99 %)*     * Growth percentiles are based on [facility] 2-20 Years data.              We Performed the Following     Allergy Shot: One injection        Primary Care Provider Office Phone # Fax #    Raymundo Hartman [phone] [address] [place] [address]        Equal Access to Services     [facility] HARJEET : Alina Garza, waaxda luqadaha, qaybta kaalmada aman, mayda bermudez. [facility] [phone].    ATENCIÓN: Si habla español, tiene a valdovinos disposición servicios gratuitos de asistencia lingüística. Llame " al [phone].    We comply with applicable federal civil rights laws and [place] laws. We do not discriminate on the basis of race, color, national origin, age, disability sex, sexual orientation or gender identity.            Thank you!     Thank you for choosing [facility]  for your care. Our goal is always to provide you with excellent care. Hearing back from our patients is one way we can continue to improve our services. Please take a few minutes to complete the written survey that you may receive in the mail after your visit with us. Thank you!             Your Updated Medication List - Protect others around you: Learn how to safely use, store and throw away your medicines at www.disposemymeds.org.          This list is accurate as of: 8/24/17  5:10 PM.  Always use your most recent med list.                   Brand Name Dispense Instructions for use Diagnosis    adapalene 0.1 % gel    DIFFERIN          albuterol 108 (90 BASE) MCG/ACT Inhaler    VENTOLIN HFA    1 Inhaler    Inhale 2 puffs into the lungs every 4 hours as needed for shortness of breath / dyspnea    Mild persistent asthma without complication       Azelastine HCl 0.15 % Soln      Spray 1 spray in nostril daily        beclomethasone 80 MCG/ACT Inhaler    QVAR    1 Inhaler    Inhale 2 puffs into the lungs 2 times daily 2 puff in morning 2 puffs at night    Mild persistent asthma       doxycycline 50 MG capsule    VIBRAMYCIN     Take 1 capsule by mouth 2 times daily        EPINEPHrine 0.3 MG/0.3ML injection 2-pack    EPIPEN/ADRENACLICK/or ANY BX GENERIC EQUIV    0.6 mL    Inject 0.3 mLs (0.3 mg) into the muscle as needed for anaphylaxis    Need for desensitization to allergens       guaiFENesin-codeine 100-10 MG/5ML Soln solution    ROBITUSSIN AC    120 mL    Take 5 mLs by mouth every 4 hours as needed for cough    Cough       ipratropium - albuterol 0.5 mg/2.5 mg/3 mL 0.5-2.5 (3) MG/3ML neb solution    DUONEB    1 Box    Inhale  1 vial (3 mLs) into the lungs every 4 hours as needed for shortness of breath / dyspnea    Mild persistent asthma without complication       ipratropium 0.03 % spray    ATROVENT          omeprazole 20 MG CR capsule    priLOSEC    60 capsule    Take 1 capsule (20 mg) by mouth 2 times daily    Cough       Spacer/Aero Chamber Mouthpiece Misc     1    use with albuterol inhaler    Exercise induced bronchospasm       ZYRTEC ALLERGY PO

## 2017-08-30 DIAGNOSIS — R05.9 COUGH: ICD-10-CM

## 2017-08-30 DIAGNOSIS — T78.40XD ALLERGIC STATE, SUBSEQUENT ENCOUNTER: Primary | ICD-10-CM

## 2017-08-30 DIAGNOSIS — J45.30 MILD PERSISTENT ASTHMA WITHOUT COMPLICATION: ICD-10-CM

## 2017-08-30 RX ORDER — IPRATROPIUM BROMIDE AND ALBUTEROL SULFATE 2.5; .5 MG/3ML; MG/3ML
1 SOLUTION RESPIRATORY (INHALATION) EVERY 4 HOURS PRN
Qty: 1 BOX | Refills: 0 | Status: SHIPPED | OUTPATIENT
Start: 2017-08-30 | End: 2017-09-26

## 2017-08-30 RX ORDER — ALBUTEROL SULFATE 90 UG/1
2 AEROSOL, METERED RESPIRATORY (INHALATION) EVERY 4 HOURS PRN
Qty: 1 INHALER | Refills: 0 | Status: SHIPPED | OUTPATIENT
Start: 2017-08-30 | End: 2017-09-26

## 2017-08-30 NOTE — TELEPHONE ENCOUNTER
Albuterol, Duoneb, and Omeprazole Medication is being filled for 1 time refill only due to:  Patient has upcoming appointment with PCP.     Azelastine Routing refill request to provider for review/approval because:  Medication is reported/historical    Evelia Bird RN

## 2017-08-30 NOTE — TELEPHONE ENCOUNTER
ventolin    And ipratropoium  Last Written Prescription Date: 9/20/16  Last Fill Quantity: 1inh , # refills: 1  1 osvaldo and 4 rf    Last Office Visit with [facility], [facility] or [facility] prescribing provider:  2/16/17   Future Office Visit:    Next 5 appointments (look out 90 days)     Sep 21, 2017  3:00 PM CDT   Nurse Only with ER ALLERGY SHOTS   [facility]    [address] [place] [address]   [phone]            Sep 25, 2017  5:30 PM CDT   Well Child with Raymundo Hartman MD   [facility] ([facility])    [address] [address]   [phone]            Oct 19, 2017  3:15 PM CDT   Nurse Only with ER ALLERGY SHOTS   [facility] ([facility])    [address] [place] [address]   [phone]            Nov 16, 2017  3:15 PM CST   Nurse Only with ER ALLERGY SHOTS   [facility] ([facility])    [address] [address]   [phone]                   Date of Last Asthma Action Plan Letter:   Asthma Action Plan Q1 Year    Topic Date Due     Asthma Action Plan - yearly  09/20/2017      Asthma Control Test:   ACT Total Scores 9/20/2016   ACT TOTAL SCORE -   ASTHMA ER VISITS -   ASTHMA HOSPITALIZATIONS -   ACT TOTAL SCORE (Goal Greater than or Equal to 20) 24   In the past 12 months, how many times did you visit the emergency room for your asthma without being admitted to the hospital? 0   In the past 12 months, how many times were you hospitalized overnight because of your asthma? 0       Date of Last Spirometry Test:   No results found for this or any previous visit.

## 2017-08-31 RX ORDER — AZELASTINE HCL 205.5 UG/1
1 SPRAY NASAL DAILY
Qty: 30 ML | Refills: 10 | Status: SHIPPED | OUTPATIENT
Start: 2017-08-31 | End: 2018-09-26

## 2017-09-21 ENCOUNTER — ALLIED HEALTH/NURSE VISIT (OUTPATIENT)
Dept: ALLERGY | Facility: OTHER | Age: 22
End: 2017-09-21
Payer: COMMERCIAL

## 2017-09-21 DIAGNOSIS — J30.9 ALLERGIC RHINITIS, UNSPECIFIED: Primary | ICD-10-CM

## 2017-09-21 PROCEDURE — 95115 IMMUNOTHERAPY ONE INJECTION: CPT

## 2017-09-21 NOTE — PROGRESS NOTES
Patient presented after waiting 30 minutes with no reaction to allergy injections. Discharged from clinic.    Kristina Carnes RN ....... 9/21/2017 4:14 PM

## 2017-09-21 NOTE — MR AVS SNAPSHOT
After Visit Summary   9/21/2017    Jaiden Lozoya    MRN: [id number]           Patient Information     Date Of Birth          1995        Visit Information        Provider Department      9/21/2017 3:00 PM ER ALLERGY SHOTS [facility]        Today's Diagnoses     Allergic rhinitis, unspecified    -  1       Follow-ups after your visit        Your next 10 appointments already scheduled     Sep 25, 2017  5:30 PM CDT   Well Child with Raymundo Hartman MD   [facility]    [address] [address]   [phone]            Oct 19, 2017  3:15 PM CDT   Nurse Only with ER ALLERGY SHOTS   [facility] ([facility])    [address]  [facility] [address] [phone]            Nov 16, 2017  3:15 PM CST   Nurse Only with ER ALLERGY SHOTS   [facility] ([facility])    [address]  [facility] [address] [phone]            Dec 14, 2017  3:15 PM CST   Nurse Only with ER ALLERGY SHOTS   [facility] ([facility])    [address]  [facility] [address] [phone]              Who to contact     If you have questions or need follow up information about today's clinic visit or your schedule please contact [facility] directly at [phone].  Normal or non-critical lab and imaging results will be communicated to you by MyChart, letter or phone within 4 business days after the clinic has received the results. If you do not hear from us within 7 days, please contact the clinic through MyChart or phone. If you have a critical or abnormal lab result, we will notify you by phone as soon as possible.  Submit refill requests through [employer] or call your pharmacy and they will forward the refill request to us. Please allow 3 business days for your refill to be completed.     "      Additional Information About Your Visit        MyChart Information     [address] lets you send messages to your doctor, view your test results, renew your prescriptions, schedule appointments and more. To sign up, go to [email or web address] . Click on \"Log in\" on the left side of the screen, which will take you to the Welcome page. Then click on \"Sign up Now\" on the right side of the page.     You will be asked to enter the access code listed below, as well as some personal information. Please follow the directions to create your username and password.     Your access code is: [email or web address]  Expires: 10/25/2017  5:04 PM     Your access code will  in 90 days. If you need help or a new code, please call your [place] clinic or [phone].        Care EveryWhere ID     This is your Care EveryWhere ID. This could be used by other organizations to access your [place] medical records  [id number]         Blood Pressure from Last 3 Encounters:   17 146/80   16 132/84   10/20/15 128/80    Weight from Last 3 Encounters:   16 134.7 kg (297 lb) (>99 %)*   16 132.9 kg (293 lb) (>99 %)*   10/20/15 127.1 kg (280 lb 4.8 oz) (>99 %)*     * Growth percentiles are based on [facility] 2-20 Years data.              We Performed the Following     Allergy Shot: One injection        Primary Care Provider Office Phone # Fax #    Raymundo Hartman [phone] [address] [place] [address]        Equal Access to Services     [facility] AH: Hadii phuong Garza, walaurel benites, qaybmayda mendoza. So [facility] [phone].    ATENCIÓN: Si habla español, tiene a valdovinos disposición servicios gratuitos de asistencia lingüística. Darlin al [phone].    We comply with applicable federal civil rights laws and [place] laws. We do not discriminate on the basis of race, color, national origin, age, disability sex, sexual orientation " or gender identity.            Thank you!     Thank you for choosing [facility]  for your care. Our goal is always to provide you with excellent care. Hearing back from our patients is one way we can continue to improve our services. Please take a few minutes to complete the written survey that you may receive in the mail after your visit with us. Thank you!             Your Updated Medication List - Protect others around you: Learn how to safely use, store and throw away your medicines at www.disposemymeds.org.          This list is accurate as of: 9/21/17  4:17 PM.  Always use your most recent med list.                   Brand Name Dispense Instructions for use Diagnosis    adapalene 0.1 % gel    DIFFERIN          albuterol 108 (90 BASE) MCG/ACT Inhaler    VENTOLIN HFA    1 Inhaler    Inhale 2 puffs into the lungs every 4 hours as needed for shortness of breath / dyspnea    Mild persistent asthma without complication       Azelastine HCl 0.15 % Soln     30 mL    Spray 1 spray in nostril daily    Allergic state, subsequent encounter       beclomethasone 80 MCG/ACT Inhaler    QVAR    1 Inhaler    Inhale 2 puffs into the lungs 2 times daily 2 puff in morning 2 puffs at night    Mild persistent asthma       doxycycline 50 MG capsule    VIBRAMYCIN     Take 1 capsule by mouth 2 times daily        EPINEPHrine 0.3 MG/0.3ML injection 2-pack    EPIPEN/ADRENACLICK/or ANY BX GENERIC EQUIV    0.6 mL    Inject 0.3 mLs (0.3 mg) into the muscle as needed for anaphylaxis    Need for desensitization to allergens       guaiFENesin-codeine 100-10 MG/5ML Soln solution    ROBITUSSIN AC    120 mL    Take 5 mLs by mouth every 4 hours as needed for cough    Cough       ipratropium - albuterol 0.5 mg/2.5 mg/3 mL 0.5-2.5 (3) MG/3ML neb solution    DUONEB    1 Box    Inhale 1 vial (3 mLs) into the lungs every 4 hours as needed for shortness of breath / dyspnea    Mild persistent asthma without complication       ipratropium  0.03 % spray    ATROVENT          omeprazole 20 MG CR capsule    priLOSEC    60 capsule    TAKE 1 CAPSULE BY MOUTH TWICE A DAY    Cough       Spacer/Aero Chamber Mouthpiece Misc     1    use with albuterol inhaler    Exercise induced bronchospasm       ZYRTEC ALLERGY PO

## 2017-09-25 ENCOUNTER — OFFICE VISIT (OUTPATIENT)
Dept: FAMILY MEDICINE | Facility: CLINIC | Age: 22
End: 2017-09-25
Payer: COMMERCIAL

## 2017-09-25 VITALS
RESPIRATION RATE: 16 BRPM | HEIGHT: 69 IN | WEIGHT: 300.25 LBS | OXYGEN SATURATION: 98 % | DIASTOLIC BLOOD PRESSURE: 72 MMHG | SYSTOLIC BLOOD PRESSURE: 132 MMHG | HEART RATE: 93 BPM | TEMPERATURE: 98.5 F | BODY MASS INDEX: 44.47 KG/M2

## 2017-09-25 DIAGNOSIS — Z00.00 ROUTINE GENERAL MEDICAL EXAMINATION AT A HEALTH CARE FACILITY: Primary | ICD-10-CM

## 2017-09-25 DIAGNOSIS — Z23 NEED FOR PROPHYLACTIC VACCINATION AND INOCULATION AGAINST INFLUENZA: ICD-10-CM

## 2017-09-25 DIAGNOSIS — J45.30 MILD PERSISTENT ASTHMA WITHOUT COMPLICATION: ICD-10-CM

## 2017-09-25 PROCEDURE — 90471 IMMUNIZATION ADMIN: CPT | Performed by: FAMILY MEDICINE

## 2017-09-25 PROCEDURE — 90686 IIV4 VACC NO PRSV 0.5 ML IM: CPT | Performed by: FAMILY MEDICINE

## 2017-09-25 PROCEDURE — 99395 PREV VISIT EST AGE 18-39: CPT | Mod: 25 | Performed by: FAMILY MEDICINE

## 2017-09-25 RX ORDER — FLUTICASONE FUROATE 200 UG/1
200 POWDER RESPIRATORY (INHALATION)
Refills: 5 | COMMUNITY
Start: 2017-08-09 | End: 2018-09-26

## 2017-09-25 ASSESSMENT — PAIN SCALES - GENERAL: PAINLEVEL: NO PAIN (0)

## 2017-09-25 NOTE — PROGRESS NOTES
Injectable Influenza Immunization Documentation    1.  Is the person to be vaccinated sick today?   No    2. Does the person to be vaccinated have an allergy to a component   of the vaccine?   No    3. Has the person to be vaccinated ever had a serious reaction   to influenza vaccine in the past?   No    4. Has the person to be vaccinated ever had Guillain-Barré syndrome?   No    Form completed by   gerri

## 2017-09-25 NOTE — PROGRESS NOTES
SUBJECTIVE:   CC: Jaiden Lozoya is an 22 year old male who presents for preventative health visit.     Physical   Annual:     Getting at least 3 servings of Calcium per day::  Yes    Bi-annual eye exam::  Yes    Dental care twice a year::  Yes    Sleep apnea or symptoms of sleep apnea::  None    Diet::  Regular (no restrictions)    Frequency of exercise::  6-7 days/week    Duration of exercise::  Greater than 60 minutes    Taking medications regularly::  Yes    Medication side effects::  None    Additional concerns today::  No                Today's PHQ-2 Score: PHQ-2 ( 1999 Pfizer) 9/25/2017   Q1: Little interest or pleasure in doing things 0   Q2: Feeling down, depressed or hopeless 0   PHQ-2 Score 0   Q1: Little interest or pleasure in doing things Not at all   Q2: Feeling down, depressed or hopeless Not at all   PHQ-2 Score 0       Abuse: Current or Past(Physical, Sexual or Emotional)- No  Do you feel safe in your environment - Yes    Social History   Substance Use Topics     Smoking status: Never Smoker     Smokeless tobacco: Never Used     Alcohol use No     The patient does not drink >3 drinks per day nor >7 drinks per week.    Last PSA: No results found for: PSA    Reviewed orders with patient. Reviewed health maintenance and updated orders accordingly - Yes  BP Readings from Last 3 Encounters:   09/25/17 132/72   01/04/17 146/80   09/20/16 132/84    Wt Readings from Last 3 Encounters:   09/25/17 (!) 300 lb 4 oz (136.2 kg)   09/20/16 297 lb (134.7 kg) (>99 %)*   04/05/16 293 lb (132.9 kg) (>99 %)*     * Growth percentiles are based on CDC 2-20 Years data.                  Current Outpatient Prescriptions   Medication Sig Dispense Refill     Azelastine HCl 0.15 % SOLN Spray 1 spray in nostril daily 30 mL 10     omeprazole (PRILOSEC) 20 MG CR capsule TAKE 1 CAPSULE BY MOUTH TWICE A DAY 60 capsule 0     doxycycline (VIBRAMYCIN) 50 MG capsule Take 1 capsule by mouth 2 times daily  4     adapalene (DIFFERIN) 0.1  "% gel   4     ipratropium (ATROVENT) 0.03 % nasal spray        Cetirizine HCl (ZYRTEC ALLERGY PO)        ARNUITY ELLIPTA 200 MCG/ACT inhalation powder 200 mcg  5     albuterol (VENTOLIN HFA) 108 (90 BASE) MCG/ACT Inhaler Inhale 2 puffs into the lungs every 4 hours as needed for shortness of breath / dyspnea (Patient not taking: Reported on 9/25/2017) 1 Inhaler 0     ipratropium - albuterol 0.5 mg/2.5 mg/3 mL (DUONEB) 0.5-2.5 (3) MG/3ML neb solution Inhale 1 vial (3 mLs) into the lungs every 4 hours as needed for shortness of breath / dyspnea (Patient not taking: Reported on 9/25/2017) 1 Box 0     EPINEPHrine (EPIPEN) 0.3 MG/0.3ML injection Inject 0.3 mLs (0.3 mg) into the muscle as needed for anaphylaxis (Patient not taking: Reported on 9/25/2017) 0.6 mL 3     SPACER/AERO CHAMBER MOUTHPIECE MISC use with albuterol inhaler (Patient not taking: No sig reported) 1 0         Reviewed and updated as needed this visit by clinical staff         Reviewed and updated as needed this visit by Provider              ROS:  C: NEGATIVE for fever, chills, change in weight  I: NEGATIVE for worrisome rashes, moles or lesions  E: NEGATIVE for vision changes or irritation  ENT: postnasal drainage and rhinorrhea-clear  R: NEGATIVE for significant cough or SOB  RESP:Hx asthma  CV: NEGATIVE for chest pain, palpitations or peripheral edema  GI: NEGATIVE for nausea, abdominal pain, heartburn, or change in bowel habits   male: negative for dysuria, hematuria, decreased urinary stream, erectile dysfunction, urethral discharge  M: NEGATIVE for significant arthralgias or myalgia  N: NEGATIVE for weakness, dizziness or paresthesias  P: NEGATIVE for changes in mood or affect, learning disabilities     OBJECTIVE:   /72 (BP Location: Right arm, Patient Position: Chair, Cuff Size: Adult Large)  Pulse 93  Temp 98.5  F (36.9  C) (Tympanic)  Resp 16  Ht 5' 9.4\" (1.763 m)  Wt (!) 300 lb 4 oz (136.2 kg)  SpO2 98%  BMI 43.83 " "kg/m2    EXAM:  GENERAL: healthy, alert and no distress  NECK: no adenopathy, no asymmetry, masses, or scars and thyroid normal to palpation  RESP: lungs clear to auscultation - no rales, rhonchi or wheezes  CV: regular rate and rhythm, normal S1 S2, no S3 or S4, no murmur, click or rub, no peripheral edema and peripheral pulses strong  ABDOMEN: soft, nontender, no hepatosplenomegaly, no masses and bowel sounds normal  MS: no gross musculoskeletal defects noted, no edema  NEURO: Normal strength and tone, mentation intact and speech normal  PSYCH: normal   LYMPH: no cervical, supraclavicular, axillary, or inguinal adenopathy    ASSESSMENT/PLAN:   1. Mild persistent asthma    - Asthma Action Plan (AAP)    2. Need for prophylactic vaccination and inoculation against influenza    - FLU VAC, SPLIT VIRUS IM > 3 YO (QUADRIVALENT) [48626]  - Vaccine Administration, Initial [08264]  - Vaccine Administration, Initial [68014]    COUNSELING:   Reviewed preventive health counseling, as reflected in patient instructions       Regular exercise       Healthy diet/nutrition         reports that he has never smoked. He has never used smokeless tobacco.    Estimated body mass index is 43.83 kg/(m^2) as calculated from the following:    Height as of this encounter: 5' 9.4\" (1.763 m).    Weight as of this encounter: 300 lb 4 oz (136.2 kg).   Weight management plan: Discussed healthy diet and exercise guidelines and patient will follow up in 12 months in clinic to re-evaluate.    Counseling Resources:  ATP IV Guidelines  Pooled Cohorts Equation Calculator  FRAX Risk Assessment  ICSI Preventive Guidelines  Dietary Guidelines for Americans, 2010  USDA's MyPlate  ASA Prophylaxis  Lung CA Screening    Raymundo Hartman MD, MD  [facility]  Answers for HPI/ROS submitted by the patient on 9/25/2017   PHQ-2 Score: 0    Injectable Influenza Immunization Documentation    1.  Is the person to be vaccinated sick today?   No    2. Does " the person to be vaccinated have an allergy to a component   of the vaccine?   No    3. Has the person to be vaccinated ever had a serious reaction   to influenza vaccine in the past?   No    4. Has the person to be vaccinated ever had Guillain-Barré syndrome?   No    Form completed by Ivana Mckeon

## 2017-09-25 NOTE — MR AVS SNAPSHOT
After Visit Summary   9/25/2017    Jaiden Lozoya    MRN: [id number]           Patient Information     Date Of Birth          1995        Visit Information        Provider Department      9/25/2017 5:30 PM Raymundo Hartman MD [facility]        Today's Diagnoses     Routine general medical examination at a health care facility    -  1    Need for prophylactic vaccination and inoculation against influenza        Mild persistent asthma without complication           Follow-ups after your visit        Your next 10 appointments already scheduled     Oct 19, 2017  3:15 PM CDT   [facility]    [address] [place] [id number]   [phone]            Nov 16, 2017  3:15 PM CST   [facility] with [facility]    [address] [id number] [phone]            Dec 14, 2017  3:15 PM CST   Nurse Only with ER ALLERGY SHOTS   [facility]    [address] [place] [id number] [phone]              Who to contact     If you have questions or need follow up information about today's clinic visit or your schedule please contact [facility] directly at [phone].  Normal or non-critical lab and imaging results will be communicated to you by MyChart, letter or phone within 4 business days after the clinic has received the results. If you do not hear from us within 7 days, please contact the clinic through MyChart or phone. If you have a critical or abnormal lab result, we will notify you by phone as soon as possible.  Submit refill requests through [facility] or call your pharmacy and they will forward the refill request to us. Please allow 3 business days for your refill to be completed.          Additional Information About  "Your Visit        [facility] Information     [employer] lets you send messages to your doctor, view your test results, renew your prescriptions, schedule appointments and more. To sign up, go to [email or web address] . Click on \"Log in\" on the left side of the screen, which will take you to the Welcome page. Then click on \"Sign up Now\" on the right side of the page.     You will be asked to enter the access code listed below, as well as some personal information. Please follow the directions to create your username and password.     Your access code is: [email or web address]  Expires: 10/25/2017  5:04 PM     Your access code will  in 90 days. If you need help or a new code, please call your [facility] clinic or [phone].        Care EveryWhere ID     This is your Care EveryWhere ID. This could be used by other organizations to access your [facility] medical records  [id number]        Your Vitals Were     Pulse Temperature Respirations Height Pulse Oximetry BMI (Body Mass Index)    93 98.5  F (36.9  C) (Tympanic) 16 5' 9.4\" (1.763 m) 98% 43.83 kg/m2       Blood Pressure from Last 3 Encounters:   17 132/72   17 146/80   16 132/84    Weight from Last 3 Encounters:   17 (!) 300 lb 4 oz (136.2 kg)   16 297 lb (134.7 kg) (>99 %)*   16 293 lb (132.9 kg) (>99 %)*     * Growth percentiles are based on CDC 2-20 Years data.              We Performed the Following     Asthma Action Plan (AAP)     FLU VAC, SPLIT VIRUS IM > 3 YO (QUADRIVALENT) [20909]     Vaccine Administration, Initial [09279]     Vaccine Administration, Initial [00911]        Primary Care Provider Office Phone # Fax #    Raymundo Hartman [phone] [address] [place] [phone]        Equal Access to Services     [facility]: Alina Garza, catrachita benites, qaybmayda mendoza. [facility] [phone].    ATENCIÓN: Si darian patel, " tiene a valdovinos disposición servicios gratuitos de asistencia lingüística. Darlin webb [phone].    We comply with applicable federal civil rights laws and [place] laws. We do not discriminate on the basis of race, color, national origin, age, disability sex, sexual orientation or gender identity.            Thank you!     Thank you for choosing [facility]  for your care. Our goal is always to provide you with excellent care. Hearing back from our patients is one way we can continue to improve our services. Please take a few minutes to complete the written survey that you may receive in the mail after your visit with us. Thank you!             Your Updated Medication List - Protect others around you: Learn how to safely use, store and throw away your medicines at www.disposemymeds.org.          This list is accurate as of: 9/25/17  7:13 PM.  Always use your most recent med list.                   Brand Name Dispense Instructions for use Diagnosis    adapalene 0.1 % gel    DIFFERIN          albuterol 108 (90 BASE) MCG/ACT Inhaler    VENTOLIN HFA    1 Inhaler    Inhale 2 puffs into the lungs every 4 hours as needed for shortness of breath / dyspnea    Mild persistent asthma without complication       ARNUITY ELLIPTA 200 MCG/ACT inhalation powder   Generic drug:  fluticasone furoate      200 mcg    Mild persistent asthma without complication       Azelastine HCl 0.15 % Soln     30 mL    Spray 1 spray in nostril daily    Allergic state, subsequent encounter       doxycycline 50 MG capsule    VIBRAMYCIN     Take 1 capsule by mouth 2 times daily        EPINEPHrine 0.3 MG/0.3ML injection 2-pack    EPIPEN/ADRENACLICK/or ANY BX GENERIC EQUIV    0.6 mL    Inject 0.3 mLs (0.3 mg) into the muscle as needed for anaphylaxis    Need for desensitization to allergens       ipratropium - albuterol 0.5 mg/2.5 mg/3 mL 0.5-2.5 (3) MG/3ML neb solution    DUONEB    1 Box    Inhale 1 vial (3 mLs) into the lungs every 4 hours  as needed for shortness of breath / dyspnea    Mild persistent asthma without complication       ipratropium 0.03 % spray    ATROVENT          omeprazole 20 MG CR capsule    priLOSEC    60 capsule    TAKE 1 CAPSULE BY MOUTH TWICE A DAY    Cough       Spacer/Aero Chamber Mouthpiece Misc     1    use with albuterol inhaler    Exercise induced bronchospasm       ZYRTEC ALLERGY PO

## 2017-09-25 NOTE — NURSING NOTE
"Chief Complaint   Patient presents with     Physical       Initial /72 (BP Location: Right arm, Patient Position: Chair, Cuff Size: Adult Large)  Pulse 93  Temp 98.5  F (36.9  C) (Tympanic)  Resp 16  Ht 5' 9.4\" (1.763 m)  Wt (!) 300 lb 4 oz (136.2 kg)  SpO2 98%  BMI 43.83 kg/m2 Estimated body mass index is 43.83 kg/(m^2) as calculated from the following:    Height as of this encounter: 5' 9.4\" (1.763 m).    Weight as of this encounter: 300 lb 4 oz (136.2 kg).  Medication Reconciliation: complete   Health Maintenance Due   Topic Date Due     ASTHMA CONTROL TEST Q6 MOS  03/20/2017     INFLUENZA VACCINE (SYSTEM ASSIGNED)  09/01/2017     ASTHMA ACTION PLAN Q1 YR  09/20/2017     Bianca Camargo, [facility]      "

## 2017-09-25 NOTE — LETTER
My Asthma Action Plan  Name: Jaiden Lozoya   YOB: 1995  Date: 9/25/2017   My doctor: Raymundo Hartman MD, MD   My clinic: [facility]        My Control Medicine: Beclomethasone (QVar) -  80 mcg daily  My Rescue Medicine: Albuterol (Proair/Ventolin/Proventil) inhaler prn   My Asthma Severity: mild persistent  Avoid your asthma triggers:                GREEN ZONE   Good Control    I feel good    No cough or wheeze    Can work, sleep and play without asthma symptoms       Take your asthma control medicine every day.     1. If exercise triggers your asthma, take your rescue medication    15 minutes before exercise or sports, and    During exercise if you have asthma symptoms  2. Spacer to use with inhaler: If you have a spacer, make sure to use it with your inhaler             YELLOW ZONE Getting Worse  I have ANY of these:    I do not feel good    Cough or wheeze    Chest feels tight    Wake up at night   1. Keep taking your Green Zone medications  2. Start taking your rescue medicine:    every 20 minutes for up to 1 hour. Then every 4 hours for 24-48 hours.  3. If you stay in the Yellow Zone for more than 12-24 hours, contact your doctor.  4. If you do not return to the Green Zone in 12-24 hours or you get worse, start taking your oral steroid medicine if prescribed by your provider.           RED ZONE Medical Alert - Get Help  I have ANY of these:    I feel awful    Medicine is not helping    Breathing getting harder    Trouble walking or talking    Nose opens wide to breathe       1. Take your rescue medicine NOW  2. If your provider has prescribed an oral steroid medicine, start taking it NOW  3. Call your doctor NOW  4. If you are still in the Red Zone after 20 minutes and you have not reached your doctor:    Take your rescue medicine again and    Call 911 or go to the emergency room right away    See your regular doctor within 2 weeks of an Emergency Room or Urgent Care visit for  follow-up treatment.        Electronically signed by: Sharon Camargo, September 25, 2017    Annual Reminders:  Meet with Asthma Educator,  Flu Shot in the Fall, consider Pneumonia Vaccination for patients with asthma (aged 19 and older).    Pharmacy:    [facility] - [place], [place] - [address] [place] [address] [address]                    Asthma Triggers  How To Control Things That Make Your Asthma Worse    Triggers are things that make your asthma worse.  Look at the list below to help you find your triggers and what you can do about them.  You can help prevent asthma flare-ups by staying away from your triggers.      Trigger                                                          What you can do   Cigarette Smoke  Tobacco smoke can make asthma worse. Do not allow smoking in your home, car or around you.  Be sure no one smokes at a child s day care or school.  If you smoke, ask your health care provider for ways to help you quit.  Ask family members to quit too.  Ask your health care provider for a referral to Quit Plan to help you quit smoking, or call 7-594-321-PLAN.     Colds, Flu, Bronchitis  These are common triggers of asthma. Wash your hands often.  Don t touch your eyes, nose or mouth.  Get a flu shot every year.     Dust Mites  These are tiny bugs that live in cloth or carpet. They are too small to see. Wash sheets and blankets in hot water every week.   Encase pillows and mattress in dust mite proof covers.  Avoid having carpet if you can. If you have carpet, vacuum weekly.   Use a dust mask and HEPA vacuum.   Pollen and Outdoor Mold  Some people are allergic to trees, grass, or weed pollen, or molds. Try to keep your windows closed.  Limit time out doors when pollen count is high.   Ask you health care provider about taking medicine during allergy season.     Animal Dander  Some people are allergic to skin flakes, urine or saliva from pets with fur or feathers. Keep pets with fur or  feathers out of your home.    If you can t keep the pet outdoors, then keep the pet out of your bedroom.  Keep the bedroom door closed.  Keep pets off cloth furniture and away from stuffed toys.     Mice, Rats, and Cockroaches  Some people are allergic to the waste from these pests.   Cover food and garbage.  Clean up spills and food crumbs.  Store grease in the refrigerator.   Keep food out of the bedroom.   Indoor Mold  This can be a trigger if your home has high moisture. Fix leaking faucets, pipes, or other sources of water.   Clean moldy surfaces.  Dehumidify basement if it is damp and smelly.   Smoke, Strong Odors, and Sprays  These can reduce air quality. Stay away from strong odors and sprays, such as perfume, powder, hair spray, paints, smoke incense, paint, cleaning products, candles and new carpet.   Exercise or Sports  Some people with asthma have this trigger. Be active!  Ask your doctor about taking medicine before sports or exercise to prevent symptoms.    Warm up for 5-10 minutes before and after sports or exercise.     Other Triggers of Asthma  Cold air:  Cover your nose and mouth with a scarf.  Sometimes laughing or crying can be a trigger.  Some medicines and food can trigger asthma.

## 2017-09-26 DIAGNOSIS — J45.30 MILD PERSISTENT ASTHMA WITHOUT COMPLICATION: ICD-10-CM

## 2017-09-26 DIAGNOSIS — R05.9 COUGH: ICD-10-CM

## 2017-09-26 RX ORDER — IPRATROPIUM BROMIDE AND ALBUTEROL SULFATE 2.5; .5 MG/3ML; MG/3ML
1 SOLUTION RESPIRATORY (INHALATION) EVERY 4 HOURS PRN
Qty: 1 BOX | Refills: 0 | Status: SHIPPED | OUTPATIENT
Start: 2017-09-26 | End: 2018-09-26

## 2017-09-26 RX ORDER — ALBUTEROL SULFATE 90 UG/1
2 AEROSOL, METERED RESPIRATORY (INHALATION) EVERY 4 HOURS PRN
Qty: 1 INHALER | Refills: 0 | Status: SHIPPED | OUTPATIENT
Start: 2017-09-26 | End: 2018-09-26

## 2017-09-26 ASSESSMENT — ASTHMA QUESTIONNAIRES: ACT_TOTALSCORE: 21

## 2017-09-26 NOTE — TELEPHONE ENCOUNTER
Reason for Call:  Medication or medication refill:     Do you use a [facility]?  Name of the pharmacy and phone number for the current request:  [facility] - [phone]    Name of the medication requested: Inhaler- Omeprazole-Vials for nebulizer    Other request: pts mom stated this were to be filled at yesterdays visit    Can we leave a detailed message on this number? YES    Phone number patient can be reached at:     Best Time:     Call taken on 9/26/2017 at 7:48 AM by Rema Chavira

## 2017-10-19 ENCOUNTER — ALLIED HEALTH/NURSE VISIT (OUTPATIENT)
Dept: ALLERGY | Facility: OTHER | Age: 22
End: 2017-10-19
Payer: COMMERCIAL

## 2017-10-19 DIAGNOSIS — J30.1 ALLERGIC RHINITIS DUE TO POLLEN: Primary | ICD-10-CM

## 2017-10-19 PROCEDURE — 99207 ZZC NO CHARGE LOS: CPT

## 2017-10-19 PROCEDURE — 95115 IMMUNOTHERAPY ONE INJECTION: CPT

## 2017-10-19 NOTE — PROGRESS NOTES
Patient presented after waiting 30 minutes with no reaction to allergy injections. Discharged from clinic.    Kristina Carnes RN ....... 10/19/2017 4:11 PM

## 2017-10-19 NOTE — MR AVS SNAPSHOT
"              After Visit Summary   10/19/2017    Jaiden Lozoya    MRN: [id number]           Patient Information     Date Of Birth          1995        Visit Information        Provider Department      10/19/2017 3:15 PM ER ALLERGY SHOTS [facility]        Today's Diagnoses     Allergic rhinitis due to pollen    -  1       Follow-ups after your visit        Your next 10 appointments already scheduled     Nov 16, 2017  3:15 PM CST   Nurse Only with ER ALLERGY SHOTS   [facility]    [address]  [place] [id number] [phone]            Dec 14, 2017  3:15 PM CST   Nurse Only with ER ALLERGY SHOTS   [facility]    [address]  [place] [id number] [phone]              Who to contact     If you have questions or need follow up information about today's clinic visit or your schedule please contact [facility] directly at [phone].  Normal or non-critical lab and imaging results will be communicated to you by [employer], letter or phone within 4 business days after the clinic has received the results. If you do not hear from us within 7 days, please contact the clinic through [employer] or phone. If you have a critical or abnormal lab result, we will notify you by phone as soon as possible.  Submit refill requests through [employer] or call your pharmacy and they will forward the refill request to us. Please allow 3 business days for your refill to be completed.          Additional Information About Your Visit        [employer] Information     [employer] lets you send messages to your doctor, view your test results, renew your prescriptions, schedule appointments and more. To sign up, go to [email or web address] . Click on \"Log in\" on the left side of the screen, which will take you to the Welcome page. Then click on \"Sign up Now\" on the right side of the " page.     You will be asked to enter the access code listed below, as well as some personal information. Please follow the directions to create your username and password.     Your access code is: [email or web address]  Expires: 10/25/2017  5:04 PM     Your access code will  in 90 days. If you need help or a new code, please call your [place] clinic or [phone].        Care EveryWhere ID     This is your Care EveryWhere ID. This could be used by other organizations to access your [place] medical records  [id number]         Blood Pressure from Last 3 Encounters:   17 132/72   17 146/80   16 132/84    Weight from Last 3 Encounters:   17 (!) 136.2 kg (300 lb 4 oz)   16 134.7 kg (297 lb) (>99 %)*   16 132.9 kg (293 lb) (>99 %)*     * Growth percentiles are based on [facility] 2-20 Years data.              We Performed the Following     Allergy Shot: One injection        Primary Care Provider Office Phone # Fax #    Raymundo Hartman [phone] [address] [place] [address]        Equal Access to Services     [facility]: Hadii phuong ku hadasho Soomaali, waaxda luqadaha, qaybta kaalmada adeegyada, mayda bermudez. So [facility] [phone].    ATENCIÓN: Si habla español, tiene a valdovinos disposición servicios gratuitos de asistencia lingüística. Llame al [phone].    We comply with applicable federal civil rights laws and [place] laws. We do not discriminate on the basis of race, color, national origin, age, disability, sex, sexual orientation, or gender identity.            Thank you!     Thank you for choosing [facility]  for your care. Our goal is always to provide you with excellent care. Hearing back from our patients is one way we can continue to improve our services. Please take a few minutes to complete the written survey that you may receive in the mail after your visit with us. Thank you!             Your Updated  Medication List - Protect others around you: Learn how to safely use, store and throw away your medicines at www.disposemymeds.org.          This list is accurate as of: 10/19/17  4:11 PM.  Always use your most recent med list.                   Brand Name Dispense Instructions for use Diagnosis    adapalene 0.1 % gel    DIFFERIN          albuterol 108 (90 BASE) MCG/ACT Inhaler    VENTOLIN HFA    1 Inhaler    Inhale 2 puffs into the lungs every 4 hours as needed for shortness of breath / dyspnea    Mild persistent asthma without complication       ARNUITY ELLIPTA 200 MCG/ACT inhalation powder   Generic drug:  fluticasone furoate      200 mcg    Mild persistent asthma without complication       Azelastine HCl 0.15 % Soln     30 mL    Spray 1 spray in nostril daily    Allergic state, subsequent encounter       doxycycline 50 MG capsule    VIBRAMYCIN     Take 1 capsule by mouth 2 times daily        EPINEPHrine 0.3 MG/0.3ML injection 2-pack    EPIPEN/ADRENACLICK/or ANY BX GENERIC EQUIV    0.6 mL    Inject 0.3 mLs (0.3 mg) into the muscle as needed for anaphylaxis    Need for desensitization to allergens       ipratropium - albuterol 0.5 mg/2.5 mg/3 mL 0.5-2.5 (3) MG/3ML neb solution    DUONEB    1 Box    Inhale 1 vial (3 mLs) into the lungs every 4 hours as needed for shortness of breath / dyspnea    Mild persistent asthma without complication       ipratropium 0.03 % spray    ATROVENT          omeprazole 20 MG CR capsule    priLOSEC    60 capsule    TAKE 1 CAPSULE BY MOUTH TWICE A DAY    Cough       Spacer/Aero Chamber Mouthpiece Misc     1    use with albuterol inhaler    Exercise induced bronchospasm       ZYRTEC ALLERGY PO

## 2017-11-16 ENCOUNTER — ALLIED HEALTH/NURSE VISIT (OUTPATIENT)
Dept: ALLERGY | Facility: OTHER | Age: 22
End: 2017-11-16
Payer: COMMERCIAL

## 2017-11-16 DIAGNOSIS — J30.1 ALLERGIC RHINITIS DUE TO POLLEN: Primary | ICD-10-CM

## 2017-11-16 PROCEDURE — 95115 IMMUNOTHERAPY ONE INJECTION: CPT

## 2017-11-16 PROCEDURE — 99207 ZZC NO CHARGE LOS: CPT

## 2017-11-16 NOTE — PROGRESS NOTES
Patient presented after waiting 30 minutes with no reaction to allergy injections. Discharged from clinic.    Ximena Jolly RN

## 2017-11-16 NOTE — MR AVS SNAPSHOT
"              After Visit Summary   11/16/2017    Jaiden Lozoya    MRN: [id number]           Patient Information     Date Of Birth          1995        Visit Information        Provider Department      11/16/2017 3:15 PM ER ALLERGY SHOTS [facility]        Today's Diagnoses     Allergic rhinitis due to pollen    -  1       Follow-ups after your visit        Your next 10 appointments already scheduled     Dec 14, 2017  3:15 PM CST   Nurse Only with ER ALLERGY SHOTS   [facility]    [address]  [place] [id number] [phone]              Who to contact     If you have questions or need follow up information about today's clinic visit or your schedule please contact [facility] directly at [phone].  Normal or non-critical lab and imaging results will be communicated to you by MyChart, letter or phone within 4 business days after the clinic has received the results. If you do not hear from us within 7 days, please contact the clinic through MyChart or phone. If you have a critical or abnormal lab result, we will notify you by phone as soon as possible.  Submit refill requests through [facility] or call your pharmacy and they will forward the refill request to us. Please allow 3 business days for your refill to be completed.          Additional Information About Your Visit        MyChart Information     [employer] [facility] lets you send messages to your doctor, view your test results, renew your prescriptions, schedule appointments and more. To sign up, go to [email or web address] . Click on \"Log in\" on the left side of the screen, which will take you to the Welcome page. Then click on \"Sign up Now\" on the right side of the page.     You will be asked to enter the access code listed below, as well as some personal information. Please follow the directions to create your username and password.     Your access code is: " [email or web address]  Expires: 2018  4:15 PM     Your access code will  in 90 days. If you need help or a new code, please call your [place] clinic or [phone].        Care EveryWhere ID     This is your Care EveryWhere ID. This could be used by other organizations to access your [place] medical records  [id number]         Blood Pressure from Last 3 Encounters:   17 132/72   17 146/80   16 132/84    Weight from Last 3 Encounters:   17 (!) 136.2 kg (300 lb 4 oz)   16 134.7 kg (297 lb) (>99 %)*   16 132.9 kg (293 lb) (>99 %)*     * Growth percentiles are based on [facility] 2-20 Years data.              We Performed the Following     Allergy Shot: One injection        Primary Care Provider Office Phone # Fax #    Raymundo Hartman [phone] [address] [place] [address]        Equal Access to Services     [facility]: Hadii phuong roger hadasho Soomaali, waaxda luqadaha, qaybta kaalmada adesusana, mayda ramos . So [facility] [phone].    ATENCIÓN: Si habla español, tiene a valdovinos disposición servicios gratuitos de asistencia lingüística. Llame al [phone].    We comply with applicable federal civil rights laws and [place] laws. We do not discriminate on the basis of race, color, national origin, age, disability, sex, sexual orientation, or gender identity.            Thank you!     Thank you for choosing [facility]  for your care. Our goal is always to provide you with excellent care. Hearing back from our patients is one way we can continue to improve our services. Please take a few minutes to complete the written survey that you may receive in the mail after your visit with us. Thank you!             Your Updated Medication List - Protect others around you: Learn how to safely use, store and throw away your medicines at www.disposemymeds.org.          This list is accurate as of: 17  4:15 PM.  Always  use your most recent med list.                   Brand Name Dispense Instructions for use Diagnosis    adapalene 0.1 % gel    DIFFERIN          albuterol 108 (90 BASE) MCG/ACT Inhaler    VENTOLIN HFA    1 Inhaler    Inhale 2 puffs into the lungs every 4 hours as needed for shortness of breath / dyspnea    Mild persistent asthma without complication       ARNUITY ELLIPTA 200 MCG/ACT inhalation powder   Generic drug:  fluticasone furoate      200 mcg    Mild persistent asthma without complication       Azelastine HCl 0.15 % Soln     30 mL    Spray 1 spray in nostril daily    Allergic state, subsequent encounter       doxycycline 50 MG capsule    VIBRAMYCIN     Take 1 capsule by mouth 2 times daily        EPINEPHrine 0.3 MG/0.3ML injection 2-pack    EPIPEN/ADRENACLICK/or ANY BX GENERIC EQUIV    0.6 mL    Inject 0.3 mLs (0.3 mg) into the muscle as needed for anaphylaxis    Need for desensitization to allergens       ipratropium - albuterol 0.5 mg/2.5 mg/3 mL 0.5-2.5 (3) MG/3ML neb solution    DUONEB    1 Box    Inhale 1 vial (3 mLs) into the lungs every 4 hours as needed for shortness of breath / dyspnea    Mild persistent asthma without complication       ipratropium 0.03 % spray    ATROVENT          omeprazole 20 MG CR capsule    priLOSEC    60 capsule    TAKE 1 CAPSULE BY MOUTH TWICE A DAY    Cough       Spacer/Aero Chamber Mouthpiece Misc     1    use with albuterol inhaler    Exercise induced bronchospasm       ZYRTEC ALLERGY PO

## 2017-12-07 DIAGNOSIS — R05.9 COUGH: ICD-10-CM

## 2017-12-07 NOTE — TELEPHONE ENCOUNTER
omeprazole (PRILOSEC) 20 MG CR capsule    Last Written Prescription Date: 9-26-17  Last Fill Quantity: 60,  # refills: 0   Last Office Visit with [facility], [facility] or [facility] prescribing provider: 9-25-17                                        Next 5 appointments (look out 90 days)     Dec 14, 2017  3:15 PM CST   Nurse Only with ER ALLERGY SHOTS   [facility]    [address]  [place] [id number] [phone]

## 2017-12-08 NOTE — TELEPHONE ENCOUNTER
LOV 09/25/2017    Prescription approved per [facility].    Evelia Bird RN

## 2018-05-12 DIAGNOSIS — R05.9 COUGH: ICD-10-CM

## 2018-05-14 NOTE — TELEPHONE ENCOUNTER
"Last Written Prescription Date:  9/26/17  Last Fill Quantity: 60,  # refills: 0   Last office visit: 9/25/2017 with prescribing provider:  9/25/17   Future Office Visit:      Requested Prescriptions   Pending Prescriptions Disp Refills     omeprazole (PRILOSEC) 20 MG CR capsule [Pharmacy Med Name: OMEPRAZOLE 20 MG CAP 20 CAP] 60 capsule 5     Sig: TAKE 1 CAPSULE BY MOUTH TWICE A DAY    PPI Protocol Passed    5/12/2018  9:50 AM       Passed - Not on Clopidogrel (unless Pantoprazole ordered)       Passed - No diagnosis of osteoporosis on record       Passed - Recent (12 mo) or future (30 days) visit within the authorizing provider's specialty    Patient had office visit in the last 12 months or has a visit in the next 30 days with authorizing provider or within the authorizing provider's specialty.  See \"Patient Info\" tab in inbasket, or \"Choose Columns\" in Meds & Orders section of the refill encounter.           Passed - Patient is age 18 or older          "

## 2018-09-26 ENCOUNTER — OFFICE VISIT (OUTPATIENT)
Dept: FAMILY MEDICINE | Facility: CLINIC | Age: 23
End: 2018-09-26
Payer: COMMERCIAL

## 2018-09-26 VITALS
HEIGHT: 70 IN | WEIGHT: 310.13 LBS | TEMPERATURE: 96.5 F | DIASTOLIC BLOOD PRESSURE: 70 MMHG | SYSTOLIC BLOOD PRESSURE: 134 MMHG | BODY MASS INDEX: 44.4 KG/M2 | OXYGEN SATURATION: 98 % | RESPIRATION RATE: 16 BRPM | HEART RATE: 108 BPM

## 2018-09-26 DIAGNOSIS — Z00.00 ROUTINE GENERAL MEDICAL EXAMINATION AT A HEALTH CARE FACILITY: Primary | ICD-10-CM

## 2018-09-26 DIAGNOSIS — F84.0 AUTISM SPECTRUM DISORDER: ICD-10-CM

## 2018-09-26 DIAGNOSIS — R05.9 COUGH: ICD-10-CM

## 2018-09-26 DIAGNOSIS — J45.30 MILD PERSISTENT ASTHMA WITHOUT COMPLICATION: ICD-10-CM

## 2018-09-26 DIAGNOSIS — K21.9 GASTROESOPHAGEAL REFLUX DISEASE WITHOUT ESOPHAGITIS: ICD-10-CM

## 2018-09-26 DIAGNOSIS — L70.0 ACNE VULGARIS: ICD-10-CM

## 2018-09-26 DIAGNOSIS — E66.01 MORBID OBESITY (H): ICD-10-CM

## 2018-09-26 DIAGNOSIS — Z23 NEED FOR PROPHYLACTIC VACCINATION AND INOCULATION AGAINST INFLUENZA: ICD-10-CM

## 2018-09-26 DIAGNOSIS — T78.40XD ALLERGIC STATE, SUBSEQUENT ENCOUNTER: ICD-10-CM

## 2018-09-26 PROCEDURE — 90686 IIV4 VACC NO PRSV 0.5 ML IM: CPT | Performed by: FAMILY MEDICINE

## 2018-09-26 PROCEDURE — 90471 IMMUNIZATION ADMIN: CPT | Performed by: FAMILY MEDICINE

## 2018-09-26 PROCEDURE — 99395 PREV VISIT EST AGE 18-39: CPT | Mod: 25 | Performed by: FAMILY MEDICINE

## 2018-09-26 ASSESSMENT — PAIN SCALES - GENERAL: PAINLEVEL: NO PAIN (0)

## 2018-09-26 NOTE — LETTER
My Asthma Action Plan  Name: Jaiden Lozoya   YOB: 1995  Date: 9/26/2018   My doctor: Raymundo Hartman MD, MD   My clinic: [facility]        My Control Medicine: arnuity  My Rescue Medicine: Albuterol (Proair/Ventolin/Proventil) inhaler prn   My Asthma Severity: mild persistent  Avoid your asthma triggers: pollens, humidity and exercise or sports               GREEN ZONE   Good Control    I feel good    No cough or wheeze    Can work, sleep and play without asthma symptoms       Take your asthma control medicine every day.     1. If exercise triggers your asthma, take your rescue medication    15 minutes before exercise or sports, and    During exercise if you have asthma symptoms  2. Spacer to use with inhaler: If you have a spacer, make sure to use it with your inhaler             YELLOW ZONE Getting Worse  I have ANY of these:    I do not feel good    Cough or wheeze    Chest feels tight    Wake up at night   1. Keep taking your Green Zone medications  2. Start taking your rescue medicine:    every 20 minutes for up to 1 hour. Then every 4 hours for 24-48 hours.  3. If you stay in the Yellow Zone for more than 12-24 hours, contact your doctor.  4. If you do not return to the Green Zone in 12-24 hours or you get worse, start taking your oral steroid medicine if prescribed by your provider.           RED ZONE Medical Alert - Get Help  I have ANY of these:    I feel awful    Medicine is not helping    Breathing getting harder    Trouble walking or talking    Nose opens wide to breathe       1. Take your rescue medicine NOW  2. If your provider has prescribed an oral steroid medicine, start taking it NOW  3. Call your doctor NOW  4. If you are still in the Red Zone after 20 minutes and you have not reached your doctor:    Take your rescue medicine again and    Call 911 or go to the emergency room right away    See your regular doctor within 2 weeks of an Emergency Room or Urgent Care  visit for follow-up treatment.          Annual Reminders:  Meet with Asthma Educator,  Flu Shot in the Fall, consider Pneumonia Vaccination for patients with asthma (aged 19 and older).    Pharmacy:    [facility] - [place], [place] - [address] [place] [address] - [place], [place] - [address]                      Asthma Triggers  How To Control Things That Make Your Asthma Worse    Triggers are things that make your asthma worse.  Look at the list below to help you find your triggers and what you can do about them.  You can help prevent asthma flare-ups by staying away from your triggers.      Trigger                                                          What you can do   Cigarette Smoke  Tobacco smoke can make asthma worse. Do not allow smoking in your home, car or around you.  Be sure no one smokes at a child s day care or school.  If you smoke, ask your health care provider for ways to help you quit.  Ask family members to quit too.  Ask your health care provider for a referral to Quit Plan to help you quit smoking, or call 1-024-001-PLAN.     Colds, Flu, Bronchitis  These are common triggers of asthma. Wash your hands often.  Don t touch your eyes, nose or mouth.  Get a flu shot every year.     Dust Mites  These are tiny bugs that live in cloth or carpet. They are too small to see. Wash sheets and blankets in hot water every week.   Encase pillows and mattress in dust mite proof covers.  Avoid having carpet if you can. If you have carpet, vacuum weekly.   Use a dust mask and HEPA vacuum.   Pollen and Outdoor Mold  Some people are allergic to trees, grass, or weed pollen, or molds. Try to keep your windows closed.  Limit time out doors when pollen count is high.   Ask you health care provider about taking medicine during allergy season.     Animal Dander  Some people are allergic to skin flakes, urine or saliva from pets with fur or feathers. Keep pets with fur or feathers out of your home.    If you can t keep  the pet outdoors, then keep the pet out of your bedroom.  Keep the bedroom door closed.  Keep pets off cloth furniture and away from stuffed toys.     Mice, Rats, and Cockroaches  Some people are allergic to the waste from these pests.   Cover food and garbage.  Clean up spills and food crumbs.  Store grease in the refrigerator.   Keep food out of the bedroom.   Indoor Mold  This can be a trigger if your home has high moisture. Fix leaking faucets, pipes, or other sources of water.   Clean moldy surfaces.  Dehumidify basement if it is damp and smelly.   Smoke, Strong Odors, and Sprays  These can reduce air quality. Stay away from strong odors and sprays, such as perfume, powder, hair spray, paints, smoke incense, paint, cleaning products, candles and new carpet.   Exercise or Sports  Some people with asthma have this trigger. Be active!  Ask your doctor about taking medicine before sports or exercise to prevent symptoms.    Warm up for 5-10 minutes before and after sports or exercise.     Other Triggers of Asthma  Cold air:  Cover your nose and mouth with a scarf.  Sometimes laughing or crying can be a trigger.  Some medicines and food can trigger asthma.

## 2018-09-26 NOTE — NURSING NOTE
Prior to injection verified patient identity using patient's name and date of birth.   Patient instructed to remain in clinic for 20 minutes afterwards and to report any adverse reaction to me immediately.  Bianca Camargo, [facility]

## 2018-09-26 NOTE — PROGRESS NOTES
SUBJECTIVE:   CC: Jaiden Lozoya is an 23 year old male who presents for preventative health visit.     Physical   Annual:     Getting at least 3 servings of Calcium per day:  Yes    Bi-annual eye exam:  Yes    Dental care twice a year:  Yes    Sleep apnea or symptoms of sleep apnea:  None    Diet:  Regular (no restrictions)    Taking medications regularly:  Yes    Medication side effects:  None    Additional concerns today:  No        He would like to get a referral for a nutritionist.    Today's PHQ-2 Score:   PHQ-2 ( 1999 [employer]) 9/26/2018   Q1: Little interest or pleasure in doing things 0   Q2: Feeling down, depressed or hopeless 0   PHQ-2 Score 0   Q1: Little interest or pleasure in doing things Not at all   Q2: Feeling down, depressed or hopeless Not at all   PHQ-2 Score 0       Abuse: Current or Past(Physical, Sexual or Emotional)- No  Do you feel safe in your environment - Yes    Social History   Substance Use Topics     Smoking status: Never Smoker     Smokeless tobacco: Never Used     Alcohol use No     Alcohol Use 9/26/2018   If you drink alcohol do you typically have greater than 3 drinks per day OR greater than 7 drinks per week? No       Last PSA: No results found for: PSA    Reviewed orders with patient. Reviewed health maintenance and updated orders accordingly - Yes      Reviewed and updated as needed this visit by clinical staff  Tobacco  Allergies  Meds  Soc Hx        Reviewed and updated as needed this visit by Provider            Review of Systems  CONSTITUTIONAL: NEGATIVE for fever, chills, change in weight  INTEGUMENTARY/SKIN: NEGATIVE for worrisome rashes, moles or lesions  EYES: NEGATIVE for vision changes or irritation  ENT: NEGATIVE for ear, mouth and throat problems  RESP: NEGATIVE for significant cough or SOB  CV: NEGATIVE for chest pain, palpitations or peripheral edema  GI: NEGATIVE for nausea, abdominal pain, heartburn, or change in bowel habits   male: negative for dysuria,  "hematuria, decreased urinary stream, erectile dysfunction, urethral discharge  MUSCULOSKELETAL: NEGATIVE for significant arthralgias or myalgia  NEURO: NEGATIVE for weakness, dizziness or paresthesias  PSYCHIATRIC: Autism Spectrum Disorder     OBJECTIVE:   /70  Pulse 108  Temp 96.5  F (35.8  C) (Tympanic)  Resp 16  Ht 5' 9.6\" (1.768 m)  Wt 310 lb 2 oz (140.7 kg)  SpO2 98%  BMI 45.01 kg/m2    Physical Exam  GENERAL: healthy, alert and no distress  NECK: no adenopathy, no asymmetry, masses, or scars and thyroid normal to palpation  RESP: lungs clear to auscultation - no rales, rhonchi or wheezes  CV: regular rate and rhythm, normal S1 S2, no S3 or S4, no murmur, click or rub, no peripheral edema and peripheral pulses strong  ABDOMEN: soft, nontender, no hepatosplenomegaly, no masses and bowel sounds normal  MS: no gross musculoskeletal defects noted, no edema  SKIN: no suspicious lesions or rashes  NEURO: Normal strength and tone, mentation intact and speech normal  BACK: no CVA tenderness, no paralumbar tenderness  PSYCH: mentation appears normal, affect normal/bright  LYMPH: no cervical, supraclavicular, axillary, or inguinal adenopathy    Diagnostic Test Results:  none     ASSESSMENT/PLAN:   1. Routine general medical examination at a health care facility      2. Need for prophylactic vaccination and inoculation against influenza    - FLU VACCINE, SPLIT VIRUS, IM (QUADRIVALENT) [29311]- >3 YRS  - Vaccine Administration, Initial [42679]    3. Autism spectrum disorder  Working on Social Security Disability     4. Morbid obesity (H)  Wants to see dietician will make a referral     5. Mild persistent asthma without complication    - ARNUITY ELLIPTA 200 MCG/ACT inhaler; ; Refill: 5  - albuterol (VENTOLIN HFA) 108 (90 Base) MCG/ACT inhaler; Inhale 2 puffs into the lungs every 4 hours as needed for shortness of breath / dyspnea  Dispense: 1 Inhaler; Refill: 0  - ipratropium - albuterol 0.5 mg/2.5 mg/3 mL " "(DUONEB) 0.5-2.5 (3) MG/3ML neb solution; Inhale 1 vial (3 mLs) into the lungs every 4 hours as needed for shortness of breath / dyspnea  Dispense: 1 Box; Refill: 0    6. Allergic state, subsequent encounter    - azelastine (ASTEPRO) 0.15 % nasal spray; Spray 1 spray in nostril daily  Dispense: 30 mL; Refill: 10    7. Cough  Reflux   - omeprazole (PRILOSEC) 20 MG CR capsule;   Dispense: 60 capsule; Refill: 3    COUNSELING:   Reviewed preventive health counseling, as reflected in patient instructions       Regular exercise       Healthy diet/nutrition    BP Readings from Last 1 Encounters:   09/26/18 134/70     Estimated body mass index is 45.01 kg/(m^2) as calculated from the following:    Height as of this encounter: 5' 9.6\" (1.768 m).    Weight as of this encounter: 310 lb 2 oz (140.7 kg).      Weight management plan: referred to Dietary      reports that he has never smoked. He has never used smokeless tobacco.  Dietary consult     Counseling Resources:  ATP IV Guidelines  Pooled Cohorts Equation Calculator  FRAX Risk Assessment  ICSI Preventive Guidelines  Dietary Guidelines for Americans, 2010  USDA's MyPlate  ASA Prophylaxis  Lung CA Screening    Raymundo Hartman MD, MD  [facility]  Answers for HPI/ROS submitted by the patient on 9/26/2018   PHQ-2 Score: 0      Injectable Influenza Immunization Documentation    1.  Is the person to be vaccinated sick today?   No    2. Does the person to be vaccinated have an allergy to a component   of the vaccine?   No  Egg Allergy Algorithm Link    3. Has the person to be vaccinated ever had a serious reaction   to influenza vaccine in the past?   No    4. Has the person to be vaccinated ever had Guillain-Barré syndrome?   No    Form completed by Bianca Camargo CMA  [facility]           "

## 2018-09-26 NOTE — MR AVS SNAPSHOT
After Visit Summary   9/26/2018    Jaiden Lozoya    MRN: [id number]           Patient Information     Date Of Birth          1995        Visit Information        Provider Department      9/26/2018 1:50 PM Raymundo Hartman MD [facility]        Today's Diagnoses     Routine general medical examination at a health care facility    -  1    Need for prophylactic vaccination and inoculation against influenza        Autism spectrum disorder        Morbid obesity (H)        Mild persistent asthma without complication        Allergic state, subsequent encounter        Cough          Care Instructions      Preventive Health Recommendations  Male Ages 21 - 25     Yearly exam:             See your health care provider every year in order to  o   Review health changes.   o   Discuss preventive care.    o   Review your medicines if your doctor has prescribed any.    You should be tested each year for STDs (sexually transmitted diseases).     Talk to your provider about cholesterol testing.      If you are at risk for diabetes, you should have a diabetes test (fasting glucose).    Shots: Get a flu shot each year. Get a tetanus shot every 10 years.     Nutrition:    Eat at least 5 servings of fruits and vegetables daily.     Eat whole-grain bread, whole-wheat pasta and brown rice instead of white grains and rice.     Get adequate calcium and Vitamin D.     Lifestyle    Exercise for at least 150 minutes a week (30 minutes a day, 5 days a week). This will help you control your weight and prevent disease.     Limit alcohol to one drink per day.     No smoking.     Wear sunscreen to prevent skin cancer.     See your dentist every six months for an exam and cleaning.             Follow-ups after your visit        Who to contact     If you have questions or need follow up information about today's clinic visit or your schedule please contact [facility] directly at [phone].  Normal  "or non-critical lab and imaging results will be communicated to you by [email or web address], letter or phone within 4 business days after the clinic has received the results. If you do not hear from us within 7 days, please contact the clinic through [employer] or phone. If you have a critical or abnormal lab result, we will notify you by phone as soon as possible.  Submit refill requests through [employer] or call your pharmacy and they will forward the refill request to us. Please allow 3 business days for your refill to be completed.          Additional Information About Your Visit        [email or web address] Information     [employer] lets you send messages to your doctor, view your test results, renew your prescriptions, schedule appointments and more. To sign up, go to [email or web address] . Click on \"Log in\" on the left side of the screen, which will take you to the Welcome page. Then click on \"Sign up Now\" on the right side of the page.     You will be asked to enter the access code listed below, as well as some personal information. Please follow the directions to create your username and password.     Your access code is: [email or web address]  Expires: 2018  7:22 PM     Your access code will  in 90 days. If you need help or a new code, please call your [place] clinic or [phone].        Care EveryWhere ID     This is your Care EveryWhere ID. This could be used by other organizations to access your [place] medical records  [id number]        Your Vitals Were     Pulse Temperature Respirations Height Pulse Oximetry BMI (Body Mass Index)    108 96.5  F (35.8  C) (Tympanic) 16 5' 9.6\" (1.768 m) 98% 45.01 kg/m2       Blood Pressure from Last 3 Encounters:   18 134/70   17 132/72   17 146/80    Weight from Last 3 Encounters:   18 310 lb 2 oz (140.7 kg)   17 (!) 300 lb 4 oz (136.2 kg)   16 297 lb (134.7 kg) (>99 %)*     * Growth percentiles are based on CDC 2-20 Years data.              We " Performed the Following     FLU VACCINE, SPLIT VIRUS, IM (QUADRIVALENT) [06856]- >3 YRS     Vaccine Administration, Initial [57130]        Primary Care Provider Office Phone # Fax #    Raymundo Hartman [phone] [address] [place] [address]        Equal Access to Services     [facility]: Hadii aad ku hadasho Soomaali, waaxda luqadaha, qaybta kaalmada adeegyada, waxay martin hayavin adearis gutierrezpaul ramos . So [facility] [phone].    ATENCIÓN: Si habla español, tiene a valdovinos disposición servicios gratuitos de asistencia lingüística. [facility] [phone].    We comply with applicable federal civil rights laws and [place] laws. We do not discriminate on the basis of race, color, national origin, age, disability, sex, sexual orientation, or gender identity.            Thank you!     Thank you for choosing [facility]  for your care. Our goal is always to provide you with excellent care. Hearing back from our patients is one way we can continue to improve our services. Please take a few minutes to complete the written survey that you may receive in the mail after your visit with us. Thank you!             Your Updated Medication List - Protect others around you: Learn how to safely use, store and throw away your medicines at www.disposemymeds.org.          This list is accurate as of 9/26/18  7:22 PM.  Always use your most recent med list.                   Brand Name Dispense Instructions for use Diagnosis    adapalene 0.1 % gel    DIFFERIN          albuterol 108 (90 Base) MCG/ACT inhaler    VENTOLIN HFA    1 Inhaler    Inhale 2 puffs into the lungs every 4 hours as needed for shortness of breath / dyspnea    Mild persistent asthma without complication       ARNUITY ELLIPTA 200 MCG/ACT inhaler   Generic drug:  fluticasone furoate      200 mcg    Mild persistent asthma without complication       azelastine 0.15 % nasal spray    ASTEPRO    30 mL    Spray 1 spray in nostril  daily    Allergic state, subsequent encounter       doxycycline 50 MG capsule    VIBRAMYCIN     Take 1 capsule by mouth 2 times daily        EPINEPHrine 0.3 MG/0.3ML injection 2-pack    EPIPEN/ADRENACLICK/or ANY BX GENERIC EQUIV    0.6 mL    Inject 0.3 mLs (0.3 mg) into the muscle as needed for anaphylaxis    Need for desensitization to allergens       ipratropium - albuterol 0.5 mg/2.5 mg/3 mL 0.5-2.5 (3) MG/3ML neb solution    DUONEB    1 Box    Inhale 1 vial (3 mLs) into the lungs every 4 hours as needed for shortness of breath / dyspnea    Mild persistent asthma without complication       ipratropium 0.03 % spray    ATROVENT          omeprazole 20 MG CR capsule    priLOSEC    60 capsule    TAKE 1 CAPSULE BY MOUTH TWICE A DAY    Cough       ZYRTEC ALLERGY PO

## 2018-09-27 ASSESSMENT — ASTHMA QUESTIONNAIRES: ACT_TOTALSCORE: 25

## 2018-09-27 NOTE — TELEPHONE ENCOUNTER
Omeprazole  Last Written Prescription Date:  05/14/2018  Last Fill Quantity: 60,  # refills: 3   Last office visit: 09/26/2018 with prescribing provider:  Devan.    Future Office Visit:  None    Prescription approved per [facility].    Evelia Bird RN

## 2018-10-01 PROBLEM — K21.9 GASTROESOPHAGEAL REFLUX DISEASE WITHOUT ESOPHAGITIS: Status: ACTIVE | Noted: 2018-10-01

## 2018-10-01 RX ORDER — IPRATROPIUM BROMIDE 21 UG/1
1 SPRAY, METERED NASAL 2 TIMES DAILY
Qty: 30 ML | Refills: 11 | Status: SHIPPED | OUTPATIENT
Start: 2018-10-01

## 2018-10-01 RX ORDER — ALBUTEROL SULFATE 90 UG/1
2 AEROSOL, METERED RESPIRATORY (INHALATION) EVERY 4 HOURS PRN
Qty: 1 INHALER | Refills: 0 | Status: SHIPPED | OUTPATIENT
Start: 2018-10-01 | End: 2019-04-23

## 2018-10-01 RX ORDER — CETIRIZINE HYDROCHLORIDE 10 MG/1
10 TABLET ORAL DAILY
Qty: 90 TABLET | Refills: 3 | Status: SHIPPED | OUTPATIENT
Start: 2018-10-01

## 2018-10-01 RX ORDER — FLUTICASONE FUROATE 200 UG/1
1 POWDER RESPIRATORY (INHALATION) DAILY
Qty: 14 EACH | Refills: 5 | Status: SHIPPED | OUTPATIENT
Start: 2018-10-01 | End: 2020-08-31

## 2018-10-01 RX ORDER — IPRATROPIUM BROMIDE AND ALBUTEROL SULFATE 2.5; .5 MG/3ML; MG/3ML
1 SOLUTION RESPIRATORY (INHALATION) EVERY 4 HOURS PRN
Qty: 1 BOX | Refills: 0 | Status: SHIPPED | OUTPATIENT
Start: 2018-10-01 | End: 2020-08-31

## 2018-10-01 RX ORDER — ADAPALENE 45 G/G
GEL TOPICAL AT BEDTIME
Qty: 45 G | Refills: 3 | Status: SHIPPED | OUTPATIENT
Start: 2018-10-01 | End: 2018-10-05

## 2018-10-01 RX ORDER — AZELASTINE HCL 205.5 UG/1
1 SPRAY NASAL DAILY
Qty: 30 ML | Refills: 10 | Status: SHIPPED | OUTPATIENT
Start: 2018-10-01

## 2018-10-05 ENCOUNTER — TELEPHONE (OUTPATIENT)
Dept: FAMILY MEDICINE | Facility: CLINIC | Age: 23
End: 2018-10-05

## 2018-10-05 DIAGNOSIS — L70.0 ACNE VULGARIS: ICD-10-CM

## 2018-10-05 RX ORDER — ADAPALENE GEL USP, 0.3% 3 MG/G
GEL TOPICAL AT BEDTIME
Qty: 45 G | Refills: 11 | Status: SHIPPED | OUTPATIENT
Start: 2018-10-05

## 2018-10-05 NOTE — TELEPHONE ENCOUNTER
Reason for Call:  Other prescription    Detailed comments: pharmacy calling states an Rx was sent over for adapalene (DIFFERIN) 0.1 % gel, pharmacy states the insurance company won't cover this Rx , states patient was getting .3% which the insurance will cover, pharmacy would like to know if the Rx can be changed to the .3%, please call and advise       Phone Number Patient can be reached at: Other phone number:  [phone]    Best Time: any    Can we leave a detailed message on this number? Not Applicable    Call taken on 10/5/2018 at 11:40 AM by Myah Haddad

## 2018-10-17 ENCOUNTER — TRANSFERRED RECORDS (OUTPATIENT)
Dept: HEALTH INFORMATION MANAGEMENT | Facility: CLINIC | Age: 23
End: 2018-10-17

## 2018-10-22 ENCOUNTER — TELEPHONE (OUTPATIENT)
Dept: FAMILY MEDICINE | Facility: CLINIC | Age: 23
End: 2018-10-22

## 2018-10-22 NOTE — TELEPHONE ENCOUNTER
Per RF- he doesn't do disability forms. Call  office and tell them. He will need a social security or disability physician.  Bianca Camargo, [facility]    Too late to call  office. Will fax paperwork back and notate on fax cover sheet Dr. Bassett message.  Bianca Camargo, [facility]

## 2018-10-22 NOTE — TELEPHONE ENCOUNTER
Our goal is to have forms completed with 72 hours, however some forms may require a visit or additional information.    Who is the form from?: Disability law office (if other please explain)  Where the form came from: form was faxed in  What clinic location was the form placed at?: [place]  Where the form was placed: 's Box - Placed on doc's desk per instructions from primary MA  What number is listed as a contact on the form?: [facility] law office - [phone]    Phone call message- patient request for a letter, form or note:    Date needed: as soon as possible, before 1/1/19  Please fax to [phone]  Has the patient signed a consent form for release of information? YES attached to form    Additional comments:     Call taken on 10/22/2018 at 11:21 AM by Neeta Rueda    Type of letter, form or note: disability

## 2018-11-23 NOTE — PROGRESS NOTES
SUBJECTIVE:   Jaiden Lozoya is a 23 year old male who presents to clinic today for the following health issues:    HPI  Concern - Ingrown Toenails   Onset: about a week or more     Description:   Right big toe is the worst, this one is swollen and painful. Pt lanced this one at home.  On the left foot there may be two ingrowns starting.     Intensity: moderate    Progression of Symptoms:  worsening    Therapies Tried and outcome: soaking in epsom salt, hydrogen peroxide    Started to soak in epsom salt one time per day and is getting better. Some drainage from right great toe. Has dried to pop it and drain this now has a scab on it. States the whole great toe and top of foot was red this is now improved.       Problem list and histories reviewed & adjusted, as indicated.  Additional history: as documented    Patient Active Problem List   Diagnosis     Attention deficit disorder     Seasonal allergic rhinitis     Mild persistent asthma     Morbid obesity (H)     Autism spectrum disorder     Gastroesophageal reflux disease without esophagitis     History reviewed. No pertinent surgical history.    Social History   Substance Use Topics     Smoking status: Never Smoker     Smokeless tobacco: Never Used     Alcohol use No     Family History   Problem Relation Age of Onset     Diabetes No family hx of      Coronary Artery Disease No family hx of      Hypertension No family hx of      Cerebrovascular Disease No family hx of      Breast Cancer No family hx of      Colon Cancer No family hx of      Prostate Cancer No family hx of      Depression No family hx of      Other Cancer No family hx of      Hyperlipidemia No family hx of          Current Outpatient Prescriptions   Medication Sig Dispense Refill     adapalene 0.3 % gel Apply topically At Bedtime 45 g 11     albuterol (VENTOLIN HFA) 108 (90 Base) MCG/ACT inhaler Inhale 2 puffs into the lungs every 4 hours as needed for shortness of breath / dyspnea 1 Inhaler 0      ARNUITY ELLIPTA 200 MCG/ACT inhaler Inhale 1 puff into the lungs daily 14 each 5     azelastine (ASTEPRO) 0.15 % nasal spray Spray 1 spray in nostril daily 30 mL 10     cetirizine (ZYRTEC ALLERGY) 10 MG tablet Take 1 tablet (10 mg) by mouth daily 90 tablet 3     doxycycline (VIBRAMYCIN) 50 MG capsule Take 1 capsule by mouth 2 times daily  4     EPINEPHrine (EPIPEN) 0.3 MG/0.3ML injection Inject 0.3 mLs (0.3 mg) into the muscle as needed for anaphylaxis 0.6 mL 3     ipratropium (ATROVENT) 0.03 % spray Spray 1 spray into both nostrils 2 times daily 30 mL 11     ipratropium - albuterol 0.5 mg/2.5 mg/3 mL (DUONEB) 0.5-2.5 (3) MG/3ML neb solution Inhale 1 vial (3 mLs) into the lungs every 4 hours as needed for shortness of breath / dyspnea 1 Box 0     omeprazole (PRILOSEC) 20 MG CR capsule TAKE 1 CAPSULE BY MOUTH TWICE A  capsule 3     omeprazole (PRILOSEC) 20 MG CR capsule Take 1 capsule (20 mg) by mouth 2 times daily 60 capsule 5     Allergies   Allergen Reactions     Seasonal Allergies      No Clinical Screening - See Comments      Other reaction(s): Runny Nose     BP Readings from Last 3 Encounters:   11/26/18 136/80   09/26/18 134/70   09/25/17 132/72    Wt Readings from Last 3 Encounters:   11/26/18 311 lb 8 oz (141.3 kg)   09/26/18 310 lb 2 oz (140.7 kg)   09/25/17 (!) 300 lb 4 oz (136.2 kg)                  Labs reviewed in EPIC    ROS:  Constitutional, HEENT, cardiovascular, pulmonary, gi and gu systems are negative, except as otherwise noted.    OBJECTIVE:     /80  Pulse 114  Temp 98.7  F (37.1  C) (Temporal)  Resp 16  Wt 311 lb 8 oz (141.3 kg)  SpO2 97%  BMI 45.21 kg/m2  Body mass index is 45.21 kg/(m^2).  GENERAL: healthy, alert and no distress  MS: no gross musculoskeletal defects noted, no edema  SKIN: right great toe medial ingrown toenail, mild erythema, mild tenderness, negative for drainage, warmth. Left great toe negative for warmth, drainage and erythema. Mild tenderness.      ASSESSMENT/PLAN:     1. Ingrown toenail of right foot  Home care instructions were reviewed with the patient. The risks, benefits and treatment options of prescribed medications or other treatments have been discussed with the patient. The patient verbalized their understanding and should call or follow up if no improvement or if they develop further problems.  If symptoms do not improve recommend podiatry referral to have this ingrown nail removed. If infection symptoms worsen return to clinic for further evaluation.       Patient Instructions     Please soak affected toe in epsom salt water for 20 minutes 3 times per day, gently lift up on nail to help remove pus and drainage, pat dry with a clean towel then apply bacitracin to affected area. In symptoms do not improve or worsen with fever, increased pain, swelling, redness and drainage please return to clinic for further assessment.     Allow nail to grow out and cut straight across.     Thank you  Nneka Chicas [facility]

## 2018-11-26 ENCOUNTER — OFFICE VISIT (OUTPATIENT)
Dept: FAMILY MEDICINE | Facility: OTHER | Age: 23
End: 2018-11-26
Payer: COMMERCIAL

## 2018-11-26 VITALS
HEART RATE: 114 BPM | WEIGHT: 311.5 LBS | SYSTOLIC BLOOD PRESSURE: 136 MMHG | TEMPERATURE: 98.7 F | DIASTOLIC BLOOD PRESSURE: 80 MMHG | OXYGEN SATURATION: 97 % | RESPIRATION RATE: 16 BRPM | BODY MASS INDEX: 45.21 KG/M2

## 2018-11-26 DIAGNOSIS — L60.0 INGROWN TOENAIL OF RIGHT FOOT: Primary | ICD-10-CM

## 2018-11-26 PROCEDURE — 99213 OFFICE O/P EST LOW 20 MIN: CPT | Performed by: NURSE PRACTITIONER

## 2018-11-26 NOTE — MR AVS SNAPSHOT
After Visit Summary   11/26/2018    Jaiden Lozoya    MRN: [id number]           Patient Information     Date Of Birth          1995        Visit Information        Provider Department      11/26/2018 8:20 AM Nneka Chicas APRN CNP [facility]        Today's Diagnoses     Screening for HIV (human immunodeficiency virus)    -  1      Care Instructions      Please soak affected toe in epsom salt water for 20 minutes 3 times per day, gently lift up on nail to help remove pus and drainage, pat dry with a clean towel then apply bacitracin to affected area. In symptoms do not improve or worsen with fever, increased pain, swelling, redness and drainage please return to clinic for further assessment.     Allow nail to grow out and cut straight across.     Thank you  Nneka Chicas CNP            Follow-ups after your visit        Who to contact     If you have questions or need follow up information about today's clinic visit or your schedule please contact [facility] directly at [phone].  Normal or non-critical lab and imaging results will be communicated to you by MyChart, letter or phone within 4 business days after the clinic has received the results. If you do not hear from us within 7 days, please contact the clinic through [employer] or phone. If you have a critical or abnormal lab result, we will notify you by phone as soon as possible.  Submit refill requests through [employer] or call your pharmacy and they will forward the refill request to us. Please allow 3 business days for your refill to be completed.          Additional Information About Your Visit        Care EveryWhere ID     This is your Care EveryWhere ID. This could be used by other organizations to access your [facility] medical records  [id number]        Your Vitals Were     Pulse Temperature Respirations Pulse Oximetry BMI (Body Mass Index)       114 98.7  F (37.1  C) (Temporal) 16 97%  45.21 kg/m2        Blood Pressure from Last 3 Encounters:   11/26/18 136/80   09/26/18 134/70   09/25/17 132/72    Weight from Last 3 Encounters:   11/26/18 311 lb 8 oz (141.3 kg)   09/26/18 310 lb 2 oz (140.7 kg)   09/25/17 (!) 300 lb 4 oz (136.2 kg)              Today, you had the following     No orders found for display       Primary Care Provider Office Phone # Fax #    Raymundo Hartman [phone] [address] [place] [address]        Equal Access to Services     [facility]: Hadii aad acacia hadasho Soomaali, waaxda luqadaha, qaybta kaalmada adearisyada, mayda ramos . So [facility] [phone].    ATENCIÓN: Si habla español, tiene a valdovinos disposición servicios gratuitos de asistencia lingüística. Ll[facility] [phone].    We comply with applicable federal civil rights laws and [place] laws. We do not discriminate on the basis of race, color, national origin, age, disability, sex, sexual orientation, or gender identity.            Thank you!     Thank you for choosing [facility]  for your care. Our goal is always to provide you with excellent care. Hearing back from our patients is one way we can continue to improve our services. Please take a few minutes to complete the written survey that you may receive in the mail after your visit with us. Thank you!             Your Updated Medication List - Protect others around you: Learn how to safely use, store and throw away your medicines at www.disposemymeds.org.          This list is accurate as of 11/26/18  8:47 AM.  Always use your most recent med list.                   Brand Name Dispense Instructions for use Diagnosis    adapalene 0.3 % gel    DIFFERIN    45 g    Apply topically At Bedtime    Acne vulgaris       albuterol 108 (90 Base) MCG/ACT inhaler    VENTOLIN HFA    1 Inhaler    Inhale 2 puffs into the lungs every 4 hours as needed for shortness of breath / dyspnea    Mild persistent asthma  without complication       ARNUITY ELLIPTA 200 MCG/ACT inhaler   Generic drug:  fluticasone     14 each    Inhale 1 puff into the lungs daily    Mild persistent asthma without complication       azelastine 0.15 % nasal spray    ASTEPRO    30 mL    Spray 1 spray in nostril daily    Allergic state, subsequent encounter       cetirizine 10 MG tablet    ZYRTEC ALLERGY    90 tablet    Take 1 tablet (10 mg) by mouth daily    Allergic state, subsequent encounter       doxycycline 50 MG capsule    VIBRAMYCIN     Take 1 capsule by mouth 2 times daily        EPINEPHrine 0.3 MG/0.3ML injection 2-pack    EPIPEN/ADRENACLICK/or ANY BX GENERIC EQUIV    0.6 mL    Inject 0.3 mLs (0.3 mg) into the muscle as needed for anaphylaxis    Need for desensitization to allergens       ipratropium - albuterol 0.5 mg/2.5 mg/3 mL 0.5-2.5 (3) MG/3ML neb solution    DUONEB    1 Box    Inhale 1 vial (3 mLs) into the lungs every 4 hours as needed for shortness of breath / dyspnea    Mild persistent asthma without complication       ipratropium 0.03 % spray    ATROVENT    30 mL    Spray 1 spray into both nostrils 2 times daily    Mild persistent asthma without complication       * omeprazole 20 MG CR capsule    priLOSEC    60 capsule    Take 1 capsule (20 mg) by mouth 2 times daily    Cough       * omeprazole 20 MG CR capsule    priLOSEC    180 capsule    TAKE 1 CAPSULE BY MOUTH TWICE A DAY    Cough, Gastroesophageal reflux disease without esophagitis       * Notice:  This list has 2 medication(s) that are the same as other medications prescribed for you. Read the directions carefully, and ask your doctor or other care provider to review them with you.

## 2018-11-26 NOTE — PATIENT INSTRUCTIONS
Please soak affected toe in epsom salt water for 20 minutes 3 times per day, gently lift up on nail to help remove pus and drainage, pat dry with a clean towel then apply bacitracin to affected area. In symptoms do not improve or worsen with fever, increased pain, swelling, redness and drainage please return to clinic for further assessment.     Allow nail to grow out and cut straight across.     Thank you  Nneka Chicas CNP

## 2018-11-27 ASSESSMENT — ASTHMA QUESTIONNAIRES: ACT_TOTALSCORE: 25

## 2019-04-23 DIAGNOSIS — J45.30 MILD PERSISTENT ASTHMA WITHOUT COMPLICATION: ICD-10-CM

## 2019-04-24 RX ORDER — ALBUTEROL SULFATE 90 UG/1
AEROSOL, METERED RESPIRATORY (INHALATION)
Qty: 18 G | Refills: 0 | Status: SHIPPED | OUTPATIENT
Start: 2019-04-24 | End: 2019-07-05

## 2019-04-24 NOTE — TELEPHONE ENCOUNTER
"Ventolin  Last Written Prescription Date:  10/01/2018  Last Fill Quantity: 1 inhaler,  # refills: 0   Last office visit: 11/26/2018 with prescribing provider:  Aviva   Future Office Visit:  None  Prescription approved per [facility] Refill Protocol.    Requested Prescriptions   Pending Prescriptions Disp Refills     VENTOLIN  (90 Base) MCG/ACT inhaler [Pharmacy Med Name: VENTOLIN HFA 90 MCG INHALER 108 (90 BAS AERO] 18 g 0     Sig: INHALE 2 PUFFS BY MOUTH EVERY 4 HOURS AS NEEDED FOR SHORTNESS OF BREATH / DYSPNEA       Asthma Maintenance Inhalers - Anticholinergics Passed - 4/23/2019  4:34 PM        Passed - Patient is age 12 years or older        Passed - Asthma control assessment score within normal limits in last 6 months     Please review ACT score.         Passed - Medication is active on med list        Passed - Recent (6 mo) or future (30 days) visit within the authorizing provider's specialty     Patient had office visit in the last 6 months or has a visit in the next 30 days with authorizing provider or within the authorizing provider's specialty.  See \"Patient Info\" tab in inbasket, or \"Choose Columns\" in Meds & Orders section of the refill encounter.        Evelia Bird RN   "

## 2019-07-05 DIAGNOSIS — J45.30 MILD PERSISTENT ASTHMA WITHOUT COMPLICATION: ICD-10-CM

## 2019-07-05 RX ORDER — ALBUTEROL SULFATE 90 UG/1
AEROSOL, METERED RESPIRATORY (INHALATION)
Qty: 18 G | Refills: 0 | Status: SHIPPED | OUTPATIENT
Start: 2019-07-05 | End: 2019-11-11

## 2019-07-05 NOTE — TELEPHONE ENCOUNTER
"Ventolin  Last Written Prescription Date:  04/24/2019  Last Fill Quantity: 18g,  # refills: 0   Last office visit: 11/26/2018 with prescribing provider:  Aviva   Future Office Visit:   Next 5 appointments (look out 90 days)    Sep 30, 2019  4:50 PM CDT  [facility]YSICAL with [facility]man [facility]  [facility] [address] [place] [id number] [phone]      Routing refill request to provider for review/approval because:  ACT completed Nov. 2018.    Requested Prescriptions   Pending Prescriptions Disp Refills     VENTOLIN  (90 Base) MCG/ACT inhaler [Pharmacy Med Name: VENTOLIN HFA 90 MCG INHALER 108 (90 BAS AERO] 18 g 0     Sig: INHALE 2 PUFFS BY MOUTH EVERY 4 HOURS AS NEEDED FOR SHORTNESS OF BREATH / DYSPNEA       Asthma Maintenance Inhalers - Anticholinergics Failed - 7/5/2019  2:32 PM        Failed - Asthma control assessment score within normal limits in last 6 months     Please review ACT score.         Failed - Recent (6 mo) or future (30 days) visit within the authorizing provider's specialty     Patient had office visit in the last 6 months or has a visit in the next 30 days with authorizing provider or within the authorizing provider's specialty.  See \"Patient Info\" tab in inbasket, or \"Choose Columns\" in Meds & Orders section of the refill encounter.          Passed - Patient is age 12 years or older        Passed - Medication is active on med list        ACT Total Scores 9/25/2017 9/26/2018 11/26/2018   ACT TOTAL SCORE - - -   ASTHMA ER VISITS - - -   ASTHMA HOSPITALIZATIONS - - -   ACT TOTAL SCORE (Goal Greater than or Equal to 20) 21 25 25   In the past 12 months, how many times did you visit the emergency room for your asthma without being admitted to the hospital? 0 0 0   In the past 12 months, how many times were you hospitalized overnight because of your asthma? 0 0 0     Evelia Bird RN   "

## 2019-07-30 ENCOUNTER — TRANSFERRED RECORDS (OUTPATIENT)
Dept: HEALTH INFORMATION MANAGEMENT | Facility: CLINIC | Age: 24
End: 2019-07-30

## 2019-10-01 ENCOUNTER — OFFICE VISIT (OUTPATIENT)
Dept: FAMILY MEDICINE | Facility: CLINIC | Age: 24
End: 2019-10-01
Payer: COMMERCIAL

## 2019-10-01 VITALS
OXYGEN SATURATION: 98 % | RESPIRATION RATE: 22 BRPM | WEIGHT: 312 LBS | TEMPERATURE: 97.7 F | HEART RATE: 116 BPM | HEIGHT: 70 IN | BODY MASS INDEX: 44.67 KG/M2 | DIASTOLIC BLOOD PRESSURE: 70 MMHG | SYSTOLIC BLOOD PRESSURE: 114 MMHG

## 2019-10-01 DIAGNOSIS — Z00.00 ROUTINE GENERAL MEDICAL EXAMINATION AT A HEALTH CARE FACILITY: Primary | ICD-10-CM

## 2019-10-01 DIAGNOSIS — E66.01 MORBID OBESITY (H): ICD-10-CM

## 2019-10-01 PROCEDURE — 99395 PREV VISIT EST AGE 18-39: CPT | Mod: 25 | Performed by: FAMILY MEDICINE

## 2019-10-01 PROCEDURE — 90471 IMMUNIZATION ADMIN: CPT | Performed by: FAMILY MEDICINE

## 2019-10-01 PROCEDURE — 90686 IIV4 VACC NO PRSV 0.5 ML IM: CPT | Performed by: FAMILY MEDICINE

## 2019-10-01 PROCEDURE — 90472 IMMUNIZATION ADMIN EACH ADD: CPT | Performed by: FAMILY MEDICINE

## 2019-10-01 PROCEDURE — 90715 TDAP VACCINE 7 YRS/> IM: CPT | Performed by: FAMILY MEDICINE

## 2019-10-01 ASSESSMENT — ENCOUNTER SYMPTOMS
NERVOUS/ANXIOUS: 0
FEVER: 0
EYE PAIN: 0
NAUSEA: 0
COUGH: 0
DIARRHEA: 0
DYSURIA: 0
CONSTIPATION: 0
HEARTBURN: 0
PARESTHESIAS: 0
FREQUENCY: 0
HEMATOCHEZIA: 0
SHORTNESS OF BREATH: 0
ARTHRALGIAS: 0
PALPITATIONS: 0
SORE THROAT: 0
DIZZINESS: 0
MYALGIAS: 0
HEMATURIA: 0
HEADACHES: 0
ABDOMINAL PAIN: 0
JOINT SWELLING: 0
WEAKNESS: 0
CHILLS: 0

## 2019-10-01 ASSESSMENT — MIFFLIN-ST. JEOR: SCORE: 2415.12

## 2019-10-01 ASSESSMENT — PAIN SCALES - GENERAL: PAINLEVEL: NO PAIN (0)

## 2019-10-01 NOTE — PROGRESS NOTES
SUBJECTIVE:   CC: Jaiden Lozoya is an 24 year old male who presents for preventative health visit.     Healthy Habits:     Getting at least 3 servings of Calcium per day:  NO    Bi-annual eye exam:  NO    Dental care twice a year:  Yes    Sleep apnea or symptoms of sleep apnea:  None    Diet:  Regular (no restrictions)    Frequency of exercise:  None    Taking medications regularly:  Not Applicable    Medication side effects:  None    PHQ-2 Total Score: 0    Additional concerns today:  No              Today's PHQ-2 Score:   PHQ-2 ( 1999 Pfizer) 10/1/2019   Q1: Little interest or pleasure in doing things 0   Q2: Feeling down, depressed or hopeless 0   PHQ-2 Score 0   Q1: Little interest or pleasure in doing things Not at all   Q2: Feeling down, depressed or hopeless Not at all   PHQ-2 Score 0       Abuse: Current or Past(Physical, Sexual or Emotional)- No  Do you feel safe in your environment? Yes    Social History     Tobacco Use     Smoking status: Never Smoker     Smokeless tobacco: Never Used   Substance Use Topics     Alcohol use: No     If you drink alcohol do you typically have >3 drinks per day or >7 drinks per week? No    Alcohol Use 10/1/2019   Prescreen: >3 drinks/day or >7 drinks/week? Not Applicable   Prescreen: >3 drinks/day or >7 drinks/week? -         Reviewed orders with patient. Reviewed health maintenance and updated orders accordingly - Yes  BP Readings from Last 3 Encounters:   10/01/19 114/70   11/26/18 136/80   09/26/18 134/70    Wt Readings from Last 3 Encounters:   10/01/19 141.5 kg (312 lb)   11/26/18 141.3 kg (311 lb 8 oz)   09/26/18 140.7 kg (310 lb 2 oz)                  Patient Active Problem List   Diagnosis     Attention deficit disorder     Seasonal allergic rhinitis     Mild persistent asthma     Morbid obesity (H)     Autism spectrum disorder     Gastroesophageal reflux disease without esophagitis     History reviewed. No pertinent surgical history.    Social History     Tobacco  Use     Smoking status: Never Smoker     Smokeless tobacco: Never Used   Substance Use Topics     Alcohol use: No     Family History   Problem Relation Age of Onset     Diabetes No family hx of      Coronary Artery Disease No family hx of      Hypertension No family hx of      Cerebrovascular Disease No family hx of      Breast Cancer No family hx of      Colon Cancer No family hx of      Prostate Cancer No family hx of      Depression No family hx of      Other Cancer No family hx of      Hyperlipidemia No family hx of          Current Outpatient Medications   Medication Sig Dispense Refill     adapalene 0.3 % gel Apply topically At Bedtime 45 g 11     ASMANEX, 60 METERED DOSES, 220 MCG/INH inhaler 220 mg  5     azelastine (ASTEPRO) 0.15 % nasal spray Spray 1 spray in nostril daily 30 mL 10     cetirizine (ZYRTEC ALLERGY) 10 MG tablet Take 1 tablet (10 mg) by mouth daily 90 tablet 3     doxycycline (VIBRAMYCIN) 50 MG capsule Take 1 capsule by mouth 2 times daily  4     ipratropium (ATROVENT) 0.03 % spray Spray 1 spray into both nostrils 2 times daily 30 mL 11     omeprazole (PRILOSEC) 20 MG CR capsule TAKE 1 CAPSULE BY MOUTH TWICE A  capsule 3     ARNUITY ELLIPTA 200 MCG/ACT inhaler Inhale 1 puff into the lungs daily (Patient not taking: Reported on 10/1/2019) 14 each 5     ipratropium - albuterol 0.5 mg/2.5 mg/3 mL (DUONEB) 0.5-2.5 (3) MG/3ML neb solution Inhale 1 vial (3 mLs) into the lungs every 4 hours as needed for shortness of breath / dyspnea (Patient not taking: Reported on 10/1/2019) 1 Box 0     VENTOLIN  (90 Base) MCG/ACT inhaler INHALE 2 PUFFS BY MOUTH EVERY 4 HOURS AS NEEDED FOR SHORTNESS OF BREATH / DYSPNEA (Patient not taking: Reported on 10/1/2019) 18 g 0       Reviewed and updated as needed this visit by clinical staff  Tobacco  Allergies  Meds  Med Hx  Surg Hx  Fam Hx  Soc Hx        Reviewed and updated as needed this visit by Provider            CONSTITUTIONAL: NEGATIVE for  "fever, chills, change in weight  INTEGUMENTARY/SKIN: NEGATIVE for worrisome rashes, moles or lesions  EYES: NEGATIVE for vision changes or irritation  ENT: NEGATIVE for ear, mouth and throat problems  RESP: NEGATIVE for significant cough or SOB  CV: NEGATIVE for chest pain, palpitations or peripheral edema  GI: NEGATIVE for nausea, abdominal pain, heartburn, or change in bowel habits   male: negative for dysuria, hematuria, decreased urinary stream, erectile dysfunction, urethral discharge  MUSCULOSKELETAL: NEGATIVE for significant arthralgias or myalgia  NEURO: NEGATIVE for weakness, dizziness or paresthesias  PSYCHIATRIC: NEGATIVE for changes in mood or affect    OBJECTIVE:   /70   Pulse 116   Temp 97.7  F (36.5  C) (Tympanic)   Resp 22   Ht 1.768 m (5' 9.6\")   Wt 141.5 kg (312 lb)   SpO2 98%   BMI 45.28 kg/m      Physical Exam  GENERAL: healthy, alert and no distress  NECK: no adenopathy, no asymmetry, masses, or scars and thyroid normal to palpation  RESP: lungs clear to auscultation - no rales, rhonchi or wheezes  CV: regular rate and rhythm, normal S1 S2, no S3 or S4, no murmur, click or rub, no peripheral edema and peripheral pulses strong  ABDOMEN: soft, nontender, no hepatosplenomegaly, no masses and bowel sounds normal  MS: no gross musculoskeletal defects noted, no edema    Diagnostic Test Results:  Labs reviewed in Epic    ASSESSMENT/PLAN:   1. Routine general medical examination at a health care facility  With autism currently lives at his mother I did encourage him to go out and see if he could get some type of employment just to stimulate his mind.  His mother will help him with this.  -      ADMIN VACCINE, FIRST    2. Morbid obesity (H)  I did have a long discussion with him about his calorie intake and daily exercise he is going to the gym with his mother so that to start but he does need to get more aggressive with this.  We do not see significant improvement I will get him in to see " "the dietitian for more aggressive intervention.  In 6 months      Estimated body mass index is 45.28 kg/m  as calculated from the following:    Height as of this encounter: 1.768 m (5' 9.6\").    Weight as of this encounter: 141.5 kg (312 lb).     Weight management plan: Discussed healthy diet and exercise guidelines     reports that he has never smoked. He has never used smokeless tobacco.      Counseling Resources:  ATP IV Guidelines  Pooled Cohorts Equation Calculator  FRAX Risk Assessment  ICSI Preventive Guidelines  Dietary Guidelines for Americans, 2010  [employer]'s MyPlate  ASA Prophylaxis  Lung CA Screening    Raymundo Hartman MD, MD  [facility]  Answers for HPI/ROS submitted by the patient on 10/1/2019   Annual Exam:  abdominal pain: No  Blood in stool: No  Blood in urine: No  chest pain: No  chills: No  congestion: No  constipation: No  cough: No  diarrhea: No  dizziness: No  ear pain: No  eye pain: No  nervous/anxious: No  fever: No  frequency: No  genital sores: No  headaches: No  hearing loss: No  heartburn: No  arthralgias: No  joint swelling: No  peripheral edema: No  mood changes: No  myalgias: No  nausea: No  dysuria: No  palpitations: No  Skin sensation changes: No  sore throat: No  urgency: No  rash: No  shortness of breath: No  visual disturbance: No  weakness: No  impotence: No  penile discharge: No    "

## 2019-10-01 NOTE — LETTER
My Asthma Action Plan    Name: Jaiden Lozoya   YOB: 1995  Date: 10/1/2019   My doctor: Raymundo Hartman MD, MD   My clinic: [facility]        My Control Medicine: Fluticasone furoate (Arnuity Ellipta) -  200 mcg daily  My Rescue Medicine: Albuterol (Proair/Ventolin/Proventil HFA) 2-4 puffs EVERY 4 HOURS as needed. Use a spacer if recommended by your provider.   My Asthma Severity:   Mild Persistent  Know your asthma triggers:                GREEN ZONE   Good Control    I feel good    No cough or wheeze    Can work, sleep and play without asthma symptoms       Take your asthma control medicine every day.     1. If exercise triggers your asthma, take your rescue medication    15 minutes before exercise or sports, and    During exercise if you have asthma symptoms  2. Spacer to use with inhaler: If you have a spacer, make sure to use it with your inhaler             YELLOW ZONE Getting Worse  I have ANY of these:    I do not feel good    Cough or wheeze    Chest feels tight    Wake up at night   1. Keep taking your Green Zone medications  2. Start taking your rescue medicine:    every 20 minutes for up to 1 hour. Then every 4 hours for 24-48 hours.  3. If you stay in the Yellow Zone for more than 12-24 hours, contact your doctor.  4. If you do not return to the Green Zone in 12-24 hours or you get worse, start taking your oral steroid medicine if prescribed by your provider.           RED ZONE Medical Alert - Get Help  I have ANY of these:    I feel awful    Medicine is not helping    Breathing getting harder    Trouble walking or talking    Nose opens wide to breathe       1. Take your rescue medicine NOW  2. If your provider has prescribed an oral steroid medicine, start taking it NOW  3. Call your doctor NOW  4. If you are still in the Red Zone after 20 minutes and you have not reached your doctor:    Take your rescue medicine again and    Call 911 or go to the emergency room right  away    See your regular doctor within 2 weeks of an Emergency Room or Urgent Care visit for follow-up treatment.          Annual Reminders:  Meet with Asthma Educator,  Flu Shot in the Fall, consider Pneumonia Vaccination for patients with asthma (aged 19 and older).    Pharmacy:    [facility] - [place], [place] - [address] [facility] [address]                          Asthma Triggers  How To Control Things That Make Your Asthma Worse    Triggers are things that make your asthma worse.  Look at the list below to help you find your triggers and what you can do about them.  You can help prevent asthma flare-ups by staying away from your triggers.      Trigger                                                          What you can do   Cigarette Smoke  Tobacco smoke can make asthma worse. Do not allow smoking in your home, car or around you.  Be sure no one smokes at a child s day care or school.  If you smoke, ask your health care provider for ways to help you quit.  Ask family members to quit too.  Ask your health care provider for a referral to Quit Plan to help you quit smoking, or call 8-568-313-PLAN.     Colds, Flu, Bronchitis  These are common triggers of asthma. Wash your hands often.  Don t touch your eyes, nose or mouth.  Get a flu shot every year.     Dust Mites  These are tiny bugs that live in cloth or carpet. They are too small to see. Wash sheets and blankets in hot water every week.   Encase pillows and mattress in dust mite proof covers.  Avoid having carpet if you can. If you have carpet, vacuum weekly.   Use a dust mask and HEPA vacuum.   Pollen and Outdoor Mold  Some people are allergic to trees, grass, or weed pollen, or molds. Try to keep your windows closed.  Limit time out doors when pollen count is high.   Ask you health care provider about taking medicine during allergy season.     Animal Dander  Some people are allergic to skin flakes, urine or saliva from pets with  fur or feathers. Keep pets with fur or feathers out of your home.    If you can t keep the pet outdoors, then keep the pet out of your bedroom.  Keep the bedroom door closed.  Keep pets off cloth furniture and away from stuffed toys.     Mice, Rats, and Cockroaches   Some people are allergic to the waste from these pests.   Cover food and garbage.  Clean up spills and food crumbs.  Store grease in the refrigerator.   Keep food out of the bedroom.   Indoor Mold  This can be a trigger if your home has high moisture. Fix leaking faucets, pipes, or other sources of water.   Clean moldy surfaces.  Dehumidify basement if it is damp and smelly.   Smoke, Strong Odors, and Sprays  These can reduce air quality. Stay away from strong odors and sprays, such as perfume, powder, hair spray, paints, smoke incense, paint, cleaning products, candles and new carpet.   Exercise or Sports  Some people with asthma have this trigger. Be active!  Ask your doctor about taking medicine before sports or exercise to prevent symptoms.    Warm up for 5-10 minutes before and after sports or exercise.     Other Triggers of Asthma  Cold air:  Cover your nose and mouth with a scarf.  Sometimes laughing or crying can be a trigger.  Some medicines and food can trigger asthma.

## 2019-10-02 ASSESSMENT — ASTHMA QUESTIONNAIRES: ACT_TOTALSCORE: 23

## 2019-10-07 DIAGNOSIS — K21.9 GASTROESOPHAGEAL REFLUX DISEASE WITHOUT ESOPHAGITIS: ICD-10-CM

## 2019-10-07 DIAGNOSIS — R05.9 COUGH: ICD-10-CM

## 2019-10-07 NOTE — TELEPHONE ENCOUNTER
"Omeprazole  Last Written Prescription Date:  10/01/2018  Last Fill Quantity: 180,  # refills: 3   Last office visit: 10/1/2019 with prescribing provider:  Devan   Future Office Visit:  None  Prescription approved per [facility] Refill Protocol.    Requested Prescriptions   Pending Prescriptions Disp Refills     omeprazole (PRILOSEC) 20 MG DR capsule [Pharmacy Med Name: OMEPRAZOLE 20 MG CPDR 20 CAP] 180 capsule 3     Sig: TAKE 1 CAPSULE BY MOUTH TWICE A DAY       PPI Protocol Passed - 10/7/2019  2:26 PM        Passed - Not on Clopidogrel (unless Pantoprazole ordered)        Passed - No diagnosis of osteoporosis on record        Passed - Recent (12 mo) or future (30 days) visit within the authorizing provider's specialty     Patient has had an office visit with the authorizing provider or a provider within the authorizing providers department within the previous 12 mos or has a future within next 30 days. See \"Patient Info\" tab in inbasket, or \"Choose Columns\" in Meds & Orders section of the refill encounter.          Passed - Medication is active on med list        Passed - Patient is age 18 or older      Evelia Bird RN   "

## 2019-11-11 DIAGNOSIS — J45.30 MILD PERSISTENT ASTHMA WITHOUT COMPLICATION: ICD-10-CM

## 2019-11-11 RX ORDER — ALBUTEROL SULFATE 90 UG/1
AEROSOL, METERED RESPIRATORY (INHALATION)
Qty: 18 G | Refills: 1 | Status: SHIPPED | OUTPATIENT
Start: 2019-11-11 | End: 2020-07-01

## 2019-11-11 NOTE — TELEPHONE ENCOUNTER
"Albuterol Inhaler  Last Written Prescription Date:  07/05/2019  Last Fill Quantity: 18g,  # refills: 0   Last office visit: 10/1/2019 with prescribing provider:  Devan   Future Office Visit:  None  Prescription approved per [facility] Refill Protocol.    Requested Prescriptions   Pending Prescriptions Disp Refills     albuterol (VENTOLIN HFA) 108 (90 Base) MCG/ACT inhaler 18 g 0     Sig: INHALE 2 PUFFS BY MOUTH EVERY 4 HOURS AS NEEDED FOR SHORTNESS OF BREATH / DYSPNEA       Asthma Maintenance Inhalers - Anticholinergics Passed - 11/11/2019 10:14 AM        Passed - Patient is age 12 years or older        Passed - Asthma control assessment score within normal limits in last 6 months     Please review ACT score.         Passed - Medication is active on med list        Passed - Recent (6 mo) or future (30 days) visit within the authorizing provider's specialty     Patient had office visit in the last 6 months or has a visit in the next 30 days with authorizing provider or within the authorizing provider's specialty.  See \"Patient Info\" tab in inbasket, or \"Choose Columns\" in Meds & Orders section of the refill encounter.        Evelia Bird RN   "

## 2020-01-06 DIAGNOSIS — K21.9 GASTROESOPHAGEAL REFLUX DISEASE WITHOUT ESOPHAGITIS: Primary | ICD-10-CM

## 2020-01-06 NOTE — TELEPHONE ENCOUNTER
Pt insurance not covering the 20 mg bid omeprazole. Discontinued that one and ordered a 40 mg - 1 tab daily.  Bianca Camargo, [facility]

## 2020-01-07 RX ORDER — OMEPRAZOLE 40 MG/1
40 CAPSULE, DELAYED RELEASE ORAL DAILY
Qty: 30 CAPSULE | Refills: 1 | Status: SHIPPED | OUTPATIENT
Start: 2020-01-07 | End: 2020-03-25

## 2020-03-25 DIAGNOSIS — K21.9 GASTROESOPHAGEAL REFLUX DISEASE WITHOUT ESOPHAGITIS: ICD-10-CM

## 2020-03-25 RX ORDER — OMEPRAZOLE 40 MG/1
40 CAPSULE, DELAYED RELEASE ORAL DAILY
Qty: 90 CAPSULE | Refills: 1 | Status: SHIPPED | OUTPATIENT
Start: 2020-03-25 | End: 2020-08-31

## 2020-03-25 NOTE — TELEPHONE ENCOUNTER
"Requested Prescriptions   Pending Prescriptions Disp Refills     omeprazole (PRILOSEC) 40 MG DR capsule [Pharmacy Med Name: OMEPRAZOLE 40MG CPDR] 30 capsule 0     Sig: TAKE 1 CAPSULE (40 MG) BY MOUTH DAILY   Last Written Prescription Date:  1/7/20  Last Fill Quantity: 30,  # refills: 1   Last office visit: 10/1/2019 with prescribing provider:  Devan   Future Office Visit:        PPI Protocol Passed - 3/25/2020  6:45 PM        Passed - Not on Clopidogrel (unless Pantoprazole ordered)        Passed - No diagnosis of osteoporosis on record        Passed - Recent (12 mo) or future (30 days) visit within the authorizing provider's specialty     Patient has had an office visit with the authorizing provider or a provider within the authorizing providers department within the previous 12 mos or has a future within next 30 days. See \"Patient Info\" tab in inbasket, or \"Choose Columns\" in Meds & Orders section of the refill encounter.              Passed - Medication is active on med list        Passed - Patient is age 18 or older             "

## 2020-03-25 NOTE — TELEPHONE ENCOUNTER
Prescription approved per [facility] [place] Protocol.    Pili Carter RN on 3/25/2020 at 6:50 PM

## 2020-06-30 DIAGNOSIS — J45.30 MILD PERSISTENT ASTHMA WITHOUT COMPLICATION: ICD-10-CM

## 2020-06-30 NOTE — TELEPHONE ENCOUNTER
Mother will have to call back to help set up gallito, before scheduling gallito. Message was left with sulaiman on VM as well. States he only has 8 puff left

## 2020-06-30 NOTE — TELEPHONE ENCOUNTER
Routing to schedulers to set up Virtual appointment for patient for asthma recheck.  Please also ask patient how much medication she has left and schedule appropriately.   If patient needs a refill before their appointment, please route to RN for completion of refill.  Upon routing message, write WHEN appointment is scheduled and if they have enough medication to last to appointment.  Thank you!

## 2020-07-01 RX ORDER — ALBUTEROL SULFATE 90 UG/1
AEROSOL, METERED RESPIRATORY (INHALATION)
Qty: 18 G | Refills: 0 | Status: SHIPPED | OUTPATIENT
Start: 2020-07-01 | End: 2020-08-31

## 2020-07-01 NOTE — TELEPHONE ENCOUNTER
"Message has been left with patient to set up appointment.     Albuterol Inhaler  Last Written Prescription Date:  11/11/2019  Last Fill Quantity: 18g,  # refills: 1   Last office visit: 10/1/2019 with prescribing provider:  Devan   Future Office Visit:  None  Routing refill request to provider for review/approval because:  ACT not current    Requested Prescriptions   Pending Prescriptions Disp Refills     albuterol (PROAIR HFA/PROVENTIL HFA/VENTOLIN HFA) 108 (90 Base) MCG/ACT inhaler [Pharmacy Med Name: ALBUTEROL SULFATE  AERS] 18 g 0     Sig: INHALE 2 PUFFS BY MOUTH EVERY 4 HOURS AS NEEDED FOR SHORTNESS OF BREATH / DYSPNEA       Asthma Maintenance Inhalers - Anticholinergics Failed - 6/30/2020  5:31 PM        Failed - Asthma control assessment score within normal limits in last 6 months     Please review ACT score.         Failed - Recent (6 mo) or future (30 days) visit within the authorizing provider's specialty     Patient had office visit in the last 6 months or has a visit in the next 30 days with authorizing provider or within the authorizing provider's specialty.  See \"Patient Info\" tab in inbasket, or \"Choose Columns\" in Meds & Orders section of the refill encounter.          Passed - Patient is age 12 years or older        Passed - Medication is active on med list       Short-Acting Beta Agonist Inhalers Protocol  Failed - 6/30/2020  5:31 PM        Failed - Asthma control assessment score within normal limits in last 6 months     Please review ACT score.         Failed - Recent (6 mo) or future (30 days) visit within the authorizing provider's specialty     Patient had office visit in the last 6 months or has a visit in the next 30 days with authorizing provider or within the authorizing provider's specialty.  See \"Patient Info\" tab in inbasket, or \"Choose Columns\" in Meds & Orders section of the refill encounter.          Passed - Patient is age 12 or older        Passed - Medication is active on " med list           ACT Total Scores 9/26/2018 11/26/2018 10/1/2019   ACT TOTAL SCORE - - -   ASTHMA ER VISITS - - -   ASTHMA HOSPITALIZATIONS - - -   ACT TOTAL SCORE (Goal Greater than or Equal to 20) 25 25 23   In the past 12 months, how many times did you visit the emergency room for your asthma without being admitted to the hospital? 0 0 0   In the past 12 months, how many times were you hospitalized overnight because of your asthma? 0 0 0     Evelia Bird RN

## 2020-08-06 ENCOUNTER — TRANSFERRED RECORDS (OUTPATIENT)
Dept: HEALTH INFORMATION MANAGEMENT | Facility: CLINIC | Age: 25
End: 2020-08-06

## 2020-08-31 ENCOUNTER — VIRTUAL VISIT (OUTPATIENT)
Dept: FAMILY MEDICINE | Facility: CLINIC | Age: 25
End: 2020-08-31
Payer: COMMERCIAL

## 2020-08-31 DIAGNOSIS — K21.9 GASTROESOPHAGEAL REFLUX DISEASE WITHOUT ESOPHAGITIS: ICD-10-CM

## 2020-08-31 PROCEDURE — 99213 OFFICE O/P EST LOW 20 MIN: CPT | Mod: 95 | Performed by: FAMILY MEDICINE

## 2020-08-31 RX ORDER — OMEPRAZOLE 40 MG/1
40 CAPSULE, DELAYED RELEASE ORAL DAILY
Qty: 90 CAPSULE | Refills: 3 | Status: SHIPPED | OUTPATIENT
Start: 2020-08-31 | End: 2021-07-08

## 2020-08-31 NOTE — PROGRESS NOTES
"Jaiden Lozoya is a 25 year old male who is being evaluated via a billable video visit.      The patient has been notified of following:     \"This video visit will be conducted via a call between you and your physician/provider. We have found that certain health care needs can be provided without the need for an in-person physical exam.  This service lets us provide the care you need with a video conversation.  If a prescription is necessary we can send it directly to your pharmacy.  If lab work is needed we can place an order for that and you can then stop by our lab to have the test done at a later time.    Video visits are billed at different rates depending on your insurance coverage.  Please reach out to your insurance provider with any questions.    If during the course of the call the physician/provider feels a video visit is not appropriate, you will not be charged for this service.\"    Patient has given verbal consent for Video visit? Yes  How would you like to obtain your AVS? Mail a copy  If you are dropped from the video visit, the video invite should be resent to: Text to cell phone: [phone]  Will anyone else be joining your video visit? No    Subjective     Jaiden Lozoya is a 25 year old male who presents today via video visit for the following health issues:    HPI    .  He is having no trouble with his asthma he does follow-up with a asthma specialist.  All his medications filled from this physician.  The only thing he gets for me is Prilosec and his stomach is doing well on the Prilosec.  He is getting exercise some walks 1 to 2 hours every day.  No complaints concerns at this time.      Asthma Follow-Up    Was ACT completed today?  No      Do you have a cough?  No    Are you experiencing any wheezing in your chest?  No    Do you have any shortness of breath?  No     How often are you using a short-acting (rescue) inhaler or nebulizer, such as Albuterol?  A few times a month    How many days " "per week do you miss taking your asthma controller medication?  0    Please describe any recent triggers for your asthma: pollens    Have you had any Emergency Room Visits, Urgent Care Visits, or Hospital Admissions since your last office visit?  No      How many servings of fruits and vegetables do you eat daily?  2-3    On average, how many sweetened beverages do you drink each day (Examples: soda, juice, sweet tea, etc.  Do NOT count diet or artificially sweetened beverages)?   3    How many days per week do you exercise enough to make your heart beat faster? 7    How many minutes a day do you exercise enough to make your heart beat faster? 30 - 60    How many days per week do you miss taking your medication? 0    Medication Followup of omeprazole, ellipta, albuterol    Taking Medication as prescribed: yes    Side Effects:  None    Medication Helping Symptoms:  yes          Video Start Time: 1335        Review of Systems   Constitutional, HEENT, cardiovascular, pulmonary, gi and gu systems are negative, except as otherwise noted.      Objective           Vitals:  No vitals were obtained today due to virtual visit.    Physical Exam     GENERAL: Healthy, alert and no distress  PSYCH: Mentation appears normal, affect normal/bright, judgement and insight intact, normal speech and appearance well-groomed.              Assessment & Plan     Gastroesophageal reflux disease without esophagitis    Table on medication no change in meds  - omeprazole (PRILOSEC) 40 MG DR capsule; Take 1 capsule (40 mg) by mouth daily     BMI:   Estimated body mass index is 45.28 kg/m  as calculated from the following:    Height as of 10/1/19: 1.768 m (5' 9.6\").    Weight as of 10/1/19: 141.5 kg (312 lb).   Weight management plan: Discussed healthy diet and exercise guidelines        Work on weight loss  Regular exercise    No follow-ups on file.    Raymundo Hartman MD, MD  [facility]      Video-Visit Details    Type of " service:  Video Visit    Video End Time:1345    Originating Location (pt. Location): Home    Distant Location (provider location):  [facility]     Platform used for Video Visit: Donna

## 2020-10-23 ENCOUNTER — HOSPITAL ENCOUNTER (EMERGENCY)
Facility: CLINIC | Age: 25
Discharge: HOME | End: 2020-10-23
Attending: EMERGENCY MEDICINE | Admitting: EMERGENCY MEDICINE
Payer: COMMERCIAL

## 2020-10-23 VITALS
WEIGHT: 310 LBS | DIASTOLIC BLOOD PRESSURE: 84 MMHG | TEMPERATURE: 98.1 F | HEART RATE: 114 BPM | SYSTOLIC BLOOD PRESSURE: 152 MMHG | RESPIRATION RATE: 28 BRPM | BODY MASS INDEX: 44.99 KG/M2 | OXYGEN SATURATION: 94 %

## 2020-10-23 DIAGNOSIS — U07.1 CLINICAL DIAGNOSIS OF COVID-19: ICD-10-CM

## 2020-10-23 DIAGNOSIS — J45.31 MILD PERSISTENT ASTHMA WITH ACUTE EXACERBATION: ICD-10-CM

## 2020-10-23 LAB
SARS-COV-2 RNA SPEC QL NAA+PROBE: ABNORMAL
SPECIMEN SOURCE: ABNORMAL

## 2020-10-23 PROCEDURE — U0003 INFECTIOUS AGENT DETECTION BY NUCLEIC ACID (DNA OR RNA); SEVERE ACUTE RESPIRATORY SYNDROME CORONAVIRUS 2 (SARS-COV-2) (CORONAVIRUS DISEASE [COVID-19]), AMPLIFIED PROBE TECHNIQUE, MAKING USE OF HIGH THROUGHPUT TECHNOLOGIES AS DESCRIBED BY CMS-2020-01-R: HCPCS | Performed by: EMERGENCY MEDICINE

## 2020-10-23 PROCEDURE — 99283 EMERGENCY DEPT VISIT LOW MDM: CPT | Performed by: EMERGENCY MEDICINE

## 2020-10-23 PROCEDURE — C9803 HOPD COVID-19 SPEC COLLECT: HCPCS | Performed by: EMERGENCY MEDICINE

## 2020-10-23 PROCEDURE — 99284 EMERGENCY DEPT VISIT MOD MDM: CPT | Performed by: EMERGENCY MEDICINE

## 2020-10-23 RX ORDER — AZITHROMYCIN 250 MG/1
TABLET, FILM COATED ORAL
Qty: 6 TABLET | Refills: 0 | Status: SHIPPED | OUTPATIENT
Start: 2020-10-23 | End: 2020-10-28

## 2020-10-23 RX ORDER — PREDNISONE 20 MG/1
40 TABLET ORAL DAILY
Qty: 10 TABLET | Refills: 0 | Status: SHIPPED | OUTPATIENT
Start: 2020-10-23 | End: 2020-10-28

## 2020-10-23 ASSESSMENT — ENCOUNTER SYMPTOMS
RHINORRHEA: 1
WHEEZING: 1
SINUS PRESSURE: 1
SINUS PAIN: 1
SHORTNESS OF BREATH: 1
COUGH: 1

## 2020-10-23 NOTE — ED AVS SNAPSHOT
[facility] Emergency Dept  [address] [place] [address]  Phone: [phone]  Fax: [phone]                                    Jaiden Lozoya   MRN: [id number]    Department: [facility] Emergency Dept   Date of Visit: 10/23/2020           After Visit Summary Signature Page    I have received my discharge instructions, and my questions have been answered. I have discussed any challenges I see with this plan with the nurse or doctor.    ..........................................................................................................................................  Patient/Patient Representative Signature      ..........................................................................................................................................  Patient Representative Print Name and Relationship to Patient    ..................................................               ................................................  Date                                   Time    ..........................................................................................................................................  Reviewed by Signature/Title    ...................................................              ..............................................  Date                                               Time          22EPIC Rev 08/18

## 2020-10-23 NOTE — ED PROVIDER NOTES
History     Chief Complaint   Patient presents with     Cough     HPI  Jaiden Lozoya is a 25 year old male who presents with low-grade fever, myalgias, fatigue, nausea and active cough/congestion.  Exposed to sister who was put positive COVID-19 as well as mother was positive COVID-19.  They both tested positive for October 15.  He lives with sibling and parent.  History for morbid obesity and mild persistent asthma.  Has been using his DuoNeb's every 4 hours still has a very active cough.  Preceding cough he has been having sinus congestion for over a week.    Allergies:  Allergies   Allergen Reactions     Seasonal Allergies      No Clinical Screening - See Comments      Other reaction(s): Runny Nose       Problem List:    Patient Active Problem List    Diagnosis Date Noted     Gastroesophageal reflux disease without esophagitis 10/01/2018     Priority: Medium     Morbid obesity (H) 09/26/2018     Priority: Medium     Autism spectrum disorder 09/26/2018     Priority: Medium     Mild persistent asthma 08/27/2012     Priority: Medium     Seasonal allergic rhinitis 09/28/2009     Priority: Medium     Attention deficit disorder 05/02/2006     Priority: Medium     Problem list name updated by automated process. Provider to review          Past Medical History:    Past Medical History:   Diagnosis Date     Asthma        Past Surgical History:    No past surgical history on file.    Family History:    Family History   Problem Relation Age of Onset     Diabetes No family hx of      Coronary Artery Disease No family hx of      Hypertension No family hx of      Cerebrovascular Disease No family hx of      Breast Cancer No family hx of      Colon Cancer No family hx of      Prostate Cancer No family hx of      Depression No family hx of      Other Cancer No family hx of      Hyperlipidemia No family hx of        Social History:  Marital Status:  Single [1]  Social History     Tobacco Use     Smoking status: Never Smoker      Smokeless tobacco: Never Used   Substance Use Topics     Alcohol use: No     Drug use: No        Medications:         adapalene 0.3 % gel       ASMANEX, 60 METERED DOSES, 220 MCG/INH inhaler       azelastine (ASTEPRO) 0.15 % nasal spray       cetirizine (ZYRTEC ALLERGY) 10 MG tablet       doxycycline (VIBRAMYCIN) 50 MG capsule       ipratropium (ATROVENT) 0.03 % spray       omeprazole (PRILOSEC) 40 MG DR capsule          Review of Systems   HENT: Positive for congestion, rhinorrhea, sinus pressure and sinus pain.    Respiratory: Positive for cough, shortness of breath and wheezing.    All other systems reviewed and are negative.      Physical Exam   BP: (!) 148/87  Pulse: 128  Temp: 98.1  F (36.7  C)  Resp: 28  Weight: 140.6 kg (310 lb)  SpO2: 93 %      Physical Exam  Vitals signs and nursing note reviewed.   Constitutional:       General: He is not in acute distress.     Appearance: He is obese. He is not diaphoretic.   HENT:      Head: Atraumatic.      Nose: Congestion and rhinorrhea present.      Mouth/Throat:      Mouth: Mucous membranes are moist.   Eyes:      General: No scleral icterus.     Conjunctiva/sclera: Conjunctivae normal.      Pupils: Pupils are equal, round, and reactive to light.   Neck:      Musculoskeletal: Neck supple. No neck rigidity.   Cardiovascular:      Heart sounds: Normal heart sounds.   Pulmonary:      Effort: No respiratory distress.      Breath sounds: Wheezing present.      Comments: Active bronchospastic cough  Abdominal:      General: Bowel sounds are normal.      Palpations: Abdomen is soft.      Tenderness: There is no abdominal tenderness.   Musculoskeletal:         General: No tenderness.   Skin:     General: Skin is warm.      Capillary Refill: Capillary refill takes less than 2 seconds.      Findings: No rash.   Neurological:      General: No focal deficit present.   Psychiatric:         Mood and Affect: Mood normal.         ED Course        Procedures                    No results found for this or any previous visit (from the past 24 hour(s)).    Medications - No data to display    Assessments & Plan (with Medical Decision Making)  Significant for COVID-19 exposure.  Patient is no PUI.   symptomatic with nausea, fatigue, myalgias, mild headache, cough. mild persistent asthma.  Risk factors include obesity and cough persist despite use of DuoNeb's every 4 hours.  Preceding Covid symptoms patient is having sinus congestion/sinus infection concerns.   On examination temp is 98.1.  Pulse of 120 though he just finished a DuoNeb before coming in.  Respirations 20 with O2 sats of 93%.  He is morbidly obese with weight of 310 pounds.  Sinus congestion noted that was clear type rhinorrhea.  TMs normal.  Oropharynx no lesions and his mucous membranes are moist.  Frequent bronchospastic cough that was continual with some expiratory wheezing but no consolidation.  Plan: We discussed high probability for COVID-19.  Is a PUI patient going into quarantine.  Patient parent also on antibiotic for possible preexistent sinus infection which he is prone to in the fall and therefore he was given Z-Artem.  He is to continue DuoNeb 4 times daily and will add prednisone 40 mg daily x5 days because of increasing bronchospasm.     I have reviewed the nursing notes.    I have reviewed the findings, diagnosis, plan and need for follow up with the patient.      New Prescriptions    No medications on file       Final diagnoses:   Clinical diagnosis of COVID-19 - Test results pending   Mild persistent asthma with acute exacerbation       10/23/2020   [facility] EMERGENCY DEPT     Silverio Gill,   10/23/20 [id number]

## 2020-10-23 NOTE — DISCHARGE INSTRUCTIONS
Continue duo nebs every 4 hours.  Do so until bronchospastic cough improves/resolves    Z-Artem as directed    Prednisone 40 mg daily x5 days    Very high probability of Covid positive status.  Test results are pending and will take 2 to 4 days.  Quarantine per CDC guidelines.    Stay well-hydrated  Monitor for fever and treat with Tylenol or ibuprofen.  Cover your cough and wash your hands thoroughly and frequently

## 2020-10-24 ENCOUNTER — TELEPHONE (OUTPATIENT)
Dept: EMERGENCY MEDICINE | Facility: CLINIC | Age: 25
End: 2020-10-24

## 2020-10-24 NOTE — TELEPHONE ENCOUNTER
"Coronavirus (COVID-19) Notification    Caller Name (Patient, parent, daughter/son, grandparent, etc)  Patient     Reason for call  Notify of Positive Coronavirus (COVID-19) lab results, assess symptoms,  review [facility] recommendations    Lab Result    Lab test:  2019-nCoV rRt-PCR or SARS-CoV-2 PCR    Oropharyngeal AND/OR nasopharyngeal swabs is POSITIVE for 2019-nCoV RNA/SARS-COV-2 PCR (COVID-19 virus)    RN Recommendations/Instructions per [facility] Coronavirus COVID-19 recommendations    Brief introduction script  Introduce self then review script:  \"I am calling on behalf of [employer].  We were notified that your Coronavirus test (COVID-19) for was POSITIVE for the virus.  I have some information to relay to you but first I wanted to mention that the [place] Dept of Health will be contacting you shortly [it's possible MD already called Patient] to talk to you more about how you are feeling and other people you have had contact with who might now also have the virus.  Also, [facility] is Partnering with the [facility] for Covid-19 research, you may be contacted directly by research staff.\"    Assessment (Inquire about Patient's current symptoms)   Assessment   Current Symptoms at time of phone call: (if no symptoms, document No symptoms]  cough, diarrhea, upset stomach   Symptoms onset (if applicable)  10/19/20     If at time of call, Patients symptoms hare worsened, the Patient should contact 911 or have someone drive them to Emergency Dept promptly:      If Patient calling 911, inform 911 personal that you have tested positive for the Coronavirus (COVID-19).  Place mask on and await 911 to arrive.    If Emergency Dept, If possible, please have another adult drive you to the Emergency Dept but you need to wear mask when in contact with other people.      Review information with Patient    Your result was positive. This means you have COVID-19 (coronavirus).  We have sent you a " letter that reviews the information that I'll be reviewing with you now.    How can I protect others?    If you have symptoms: stay home and away from others (self-isolate) until:    You've had no fever--and no medicine that reduces fever--for 1 full day (24 hours). And       Your other symptoms have gotten better. For example, your cough or breathing has improved. And     At least 10 days have passed since your symptoms started. (If you've been told by a doctor that you have a weak immune system, wait 20 days.)     If you don't have symptoms: Stay home and away from others (self-isolate) until at least 10 days have passed since your first positive COVID-19 test. (Date test collected)    During this time:    Stay in your own room, including for meals. Use your own bathroom if you can.    Stay away from others in your home. No hugging, kissing or shaking hands. No visitors.     Don't go to work, school or anywhere else.     Clean  high touch  surfaces often (doorknobs, counters, handles, etc.). Use a household cleaning spray or wipes. You'll find a full list on the EPA website at www.epa.gov/pesticide-registration/list-n-disinfectants-use-against-sars-cov-2.     Cover your mouth and nose with a mask, tissue or other face covering to avoid spreading germs.    Wash your hands and face often with soap and water.    Caregivers in these groups are at risk for severe illness due to COVID-19:  o People 65 years and older  o People who live in a nursing home or long-term care facility  o People with chronic disease (lung, heart, cancer, diabetes, kidney, liver, immunologic)  o People who have a weakened immune system, including those who:  - Are in cancer treatment  - Take medicine that weakens the immune system, such as corticosteroids  - Had a bone marrow or organ transplant  - Have an immune deficiency  - Have poorly controlled HIV or AIDS  - Are obese (body mass index of 40 or higher)  - Smoke regularly    Caregivers  should wear gloves while washing dishes, handling laundry and cleaning bedrooms and bathrooms.    Wash and dry laundry with special caution. Don't shake dirty laundry, and use the warmest water setting you can.    If you have a weakened immune system, ask your doctor about other actions you should take.    For more tips, go to www.cdc.gov/coronavirus/2019-ncov/downloads/10Things.pdf.    You should not go back to work until you meet the guidelines above for ending your home isolation. You don't need to be retested for COVID-19 before going back to work--studies show that you won't spread the virus if it's been at least 10 days since your symptoms started (or 20 days, if you have a weak immune system).    Employers: This document serves as formal notice of your employee's medical guidelines for going back to work. They must meet the above guidelines before going back to work in person.    How can I take care of myself?    1. Get lots of rest. Drink extra fluids (unless a doctor has told you not to).    2. Take Tylenol (acetaminophen) for fever or pain. If you have liver or kidney problems, ask your family doctor if it's okay to take Tylenol.     Take either:     650 mg (two 325 mg pills) every 4 to 6 hours, or     1,000 mg (two 500 mg pills) every 8 hours as needed.     Note: Don't take more than 3,000 mg in one day. Acetaminophen is found in many medicines (both prescribed and over-the-counter medicines). Read all labels to be sure you don't take too much.    For children, check the Tylenol bottle for the right dose (based on their age or weight).    3. If you have other health problems (like cancer, heart failure, an organ transplant or severe kidney disease): Call your specialty clinic if you don't feel better in the next 2 days.    4. Know when to call 911: Emergency warning signs include:    Trouble breathing or shortness of breath    Pain or pressure in the chest that doesn't go away    Feeling confused like you  haven't felt before, or not being able to wake up    Bluish-colored lips or face    5. Sign up for [employer]. We know it's scary to hear that you have COVID-19. We want to track your symptoms to make sure you're okay over the next 2 weeks. Please look for an email from [employer]--this is a free, online program that we'll use to keep in touch. To sign up, follow the link in the email. Learn more at [email or web address].    Where can I get more information?    [facility]: [email or web address]    Coronavirus Basics: www.[email or web address]./diseases/coronavirus/basics.html    What to Do If You're Sick: www.cdc.gov/coronavirus/2019-ncov/about/steps-when-sick.html    Ending Home Isolation: www.cdc.gov/coronavirus/2019-ncov/hcp/disposition-in-home-patients.html     Caring for Someone with COVID-19: www.cdc.gov/coronavirus/2019-ncov/if-you-are-sick/care-for-someone.html     [facility] clinical trials (COVID-19 research studies): clinicalaffairs.[facility]-clinical-trials     A Positive COVID-19 letter will be sent via [employer] or the mail. (Exception, no letters sent to Presurgerical/Preprocedure Patients)    [Name]  Soraya Nassar RN  [employer] [facility]  COVID19 Results Team RN  Ph# [phone]

## 2020-10-26 ENCOUNTER — PATIENT OUTREACH (OUTPATIENT)
Dept: CARE COORDINATION | Facility: CLINIC | Age: 25
End: 2020-10-26

## 2020-10-26 DIAGNOSIS — U07.1 2019 NOVEL CORONAVIRUS DISEASE (COVID-19): Primary | ICD-10-CM

## 2020-10-26 NOTE — LETTER
New Site CARE COORDINATION  [address]   [place] [address]    October 26, 2020    Jaiden Lozoya  [address] [place] [id number]      Dear Jaiden,    I am a clinic community health worker who works with Raymundo Hartman MD, MD at [facility]. I wanted to thank you for spending the time to talk with me.  Below is a description of clinic care coordination and how I can further assist you.      The clinic care coordination team is made up of a registered nurse,  and community health worker who understand the health care system. The goal of clinic care coordination is to help you manage your health and improve access to the health care system in the most efficient manner. The team can assist you in meeting your health care goals by providing education, coordinating services, strengthening the communication among your providers and supporting you with any resource needs.    Please feel free to contact me at [phone] with any questions or concerns. We are focused on providing you with the highest-quality healthcare experience possible and that all starts with you.     Sincerely,     DAGO Frias  Clinic Care Coordination   [phone]  [email or web address]

## 2020-10-26 NOTE — PROGRESS NOTES
Clinic Care Coordination Contact  Community Health Worker Initial Outreach    Patient accepts CC: No, needs are being met, not interested at this time. Patient will be sent Care Coordination introduction letter for future reference.

## 2021-04-12 ENCOUNTER — IMMUNIZATION (OUTPATIENT)
Dept: FAMILY MEDICINE | Facility: CLINIC | Age: 26
End: 2021-04-12
Payer: COMMERCIAL

## 2021-04-12 PROCEDURE — 91301 PR COVID VAC MODERNA 100 MCG/0.5 ML IM: CPT

## 2021-04-12 PROCEDURE — 0011A PR COVID VAC MODERNA 100 MCG/0.5 ML IM: CPT

## 2021-05-10 ENCOUNTER — IMMUNIZATION (OUTPATIENT)
Dept: FAMILY MEDICINE | Facility: CLINIC | Age: 26
End: 2021-05-10
Attending: FAMILY MEDICINE
Payer: COMMERCIAL

## 2021-05-10 PROCEDURE — 0012A PR COVID VAC MODERNA 100 MCG/0.5 ML IM: CPT

## 2021-05-10 PROCEDURE — 91301 PR COVID VAC MODERNA 100 MCG/0.5 ML IM: CPT

## 2021-07-08 ENCOUNTER — VIRTUAL VISIT (OUTPATIENT)
Dept: FAMILY MEDICINE | Facility: CLINIC | Age: 26
End: 2021-07-08
Payer: COMMERCIAL

## 2021-07-08 DIAGNOSIS — K21.9 GASTROESOPHAGEAL REFLUX DISEASE WITHOUT ESOPHAGITIS: ICD-10-CM

## 2021-07-08 PROCEDURE — 99213 OFFICE O/P EST LOW 20 MIN: CPT | Mod: 95 | Performed by: PHYSICIAN ASSISTANT

## 2021-07-08 RX ORDER — OMEPRAZOLE 40 MG/1
40 CAPSULE, DELAYED RELEASE ORAL DAILY
Qty: 90 CAPSULE | Refills: 3 | Status: SHIPPED | OUTPATIENT
Start: 2021-07-08

## 2021-07-08 NOTE — PROGRESS NOTES
Jaiden is a 26 year old who is being evaluated via a billable video visit.      How would you like to obtain your AVS? Mail a copy  If the video visit is dropped, the invitation should be resent by: Text to cell phone: [phone]  Will anyone else be joining your video visit? No    Video Start Time: Start: 07/08/2021 03:47 pm  Stop: 07/08/2021 03:59 pm    Assessment & Plan     Gastroesophageal reflux disease without esophagitis  Patient has been taking medication without adverse effect. He has identified chocolate as a trigger food. Tries to eat a well balanced diet. No changes will be made to the medication at this time.   - omeprazole (PRILOSEC) 40 MG DR capsule; Take 1 capsule (40 mg) by mouth daily      Return in about 3 months (around 10/8/2021) for Return for scheduled annual checkup with PCP.    CITLALY Pagan [facility]   Jaiden is a 26 year old who presents for the following health issues  accompanied by his mother and sister:    HPI     Medication Followup of Omeprazole    Taking Medication as prescribed: yes    Side Effects:  None    Medication Helping Symptoms:  yes     Patient is a 26 year old male who presents today with mother and sister for virtual visit for medication refill. He has not been seen in clinic since 10/2019. His reflux has been managed with omeprazole once daily for some time. He denies adverse effect from the medication. He says that he has once concern which is pain in his feet which is occurring while walking. His mother says that they will schedule an in office visit to have this evaluated.     Review of Systems   Constitutional, HEENT, cardiovascular, pulmonary, gi and gu systems are negative, except as otherwise noted.      Objective           Vitals:  No vitals were obtained today due to virtual visit.    Physical Exam   GENERAL: Healthy, alert and no distress  EYES: Eyes grossly normal to inspection.  No discharge or erythema, or  obvious scleral/conjunctival abnormalities.  RESP: No audible wheeze, cough, or visible cyanosis.  No visible retractions or increased work of breathing.    SKIN: Visible skin clear. No significant rash, abnormal pigmentation or lesions.  NEURO: Cranial nerves grossly intact.  Mentation and speech appropriate for age.  PSYCH: Mentation appears normal, affect normal/bright, judgement and insight intact, normal speech and appearance well-groomed.          Video-Visit Details    Type of service:  Video Visit    Video End Time:Start: 07/08/2021 03:47 pm  Stop: 07/08/2021 03:59 pm    Originating Location (pt. Location): Home    Distant Location (provider location):  [facility]     Platform used for Video Visit: [employer]

## 2021-07-08 NOTE — NURSING NOTE
Health Maintenance Due   Topic Date Due     ANNUAL REVIEW OF HM ORDERS  Never done     ADVANCE CARE PLANNING  Never done     Pneumococcal Vaccine: Pediatrics (0 to 5 Years) and At-Risk Patients (6 to 64 Years) (1 of 2 - PPSV23) Never done     HIV SCREENING  Never done     HEPATITIS C SCREENING  Never done     ASTHMA CONTROL TEST  04/01/2020     PREVENTIVE CARE VISIT  10/01/2020     ASTHMA ACTION PLAN  10/01/2020     Katie HYDE LPN

## 2021-07-20 ENCOUNTER — TRANSFERRED RECORDS (OUTPATIENT)
Dept: HEALTH INFORMATION MANAGEMENT | Facility: CLINIC | Age: 26
End: 2021-07-20